# Patient Record
Sex: FEMALE | Race: WHITE | NOT HISPANIC OR LATINO | Employment: FULL TIME | URBAN - METROPOLITAN AREA
[De-identification: names, ages, dates, MRNs, and addresses within clinical notes are randomized per-mention and may not be internally consistent; named-entity substitution may affect disease eponyms.]

---

## 2022-07-20 ENCOUNTER — EMPLOYEE HEALTH (OUTPATIENT)
Dept: OCCUPATIONAL MEDICINE | Facility: CLINIC | Age: 22
End: 2022-07-20

## 2022-07-20 ENCOUNTER — HOSPITAL ENCOUNTER (OUTPATIENT)
Facility: MEDICAL CENTER | Age: 22
End: 2022-07-20
Attending: PREVENTIVE MEDICINE
Payer: COMMERCIAL

## 2022-07-20 ENCOUNTER — EH NON-PROVIDER (OUTPATIENT)
Dept: OCCUPATIONAL MEDICINE | Facility: CLINIC | Age: 22
End: 2022-07-20

## 2022-07-20 DIAGNOSIS — Z02.1 PRE-EMPLOYMENT DRUG SCREENING: ICD-10-CM

## 2022-07-20 DIAGNOSIS — Z02.1 PRE-EMPLOYMENT HEALTH SCREENING EXAMINATION: ICD-10-CM

## 2022-07-20 DIAGNOSIS — Z02.1 PRE-EMPLOYMENT DRUG SCREENING: Primary | ICD-10-CM

## 2022-07-20 LAB
AMP AMPHETAMINE: NORMAL
BAR BARBITURATES: NORMAL
BZO BENZODIAZEPINES: NORMAL
COC COCAINE: NORMAL
INT CON NEG: NORMAL
INT CON POS: NORMAL
MDMA ECSTASY: NORMAL
MET METHAMPHETAMINES: NORMAL
MTD METHADONE: NORMAL
OPI OPIATES: NORMAL
OXY OXYCODONE: NORMAL
PCP PHENCYCLIDINE: NORMAL
POC URINE DRUG SCREEN OCDRS: NORMAL
THC: NORMAL

## 2022-07-20 PROCEDURE — 80305 DRUG TEST PRSMV DIR OPT OBS: CPT | Performed by: PREVENTIVE MEDICINE

## 2022-07-20 PROCEDURE — 8911 PR MRO FEE: Performed by: NURSE PRACTITIONER

## 2022-07-20 PROCEDURE — 8915 PR COMPREHENSIVE PHYSICAL: Performed by: PREVENTIVE MEDICINE

## 2022-07-20 PROCEDURE — 86480 TB TEST CELL IMMUN MEASURE: CPT | Performed by: PREVENTIVE MEDICINE

## 2022-07-20 PROCEDURE — 90715 TDAP VACCINE 7 YRS/> IM: CPT | Performed by: NURSE PRACTITIONER

## 2022-07-22 LAB
GAMMA INTERFERON BACKGROUND BLD IA-ACNC: 0.04 IU/ML
M TB IFN-G BLD-IMP: NEGATIVE
M TB IFN-G CD4+ BCKGRND COR BLD-ACNC: 0 IU/ML
MITOGEN IGNF BCKGRD COR BLD-ACNC: >10 IU/ML
QFT TB2 - NIL TBQ2: 0.01 IU/ML

## 2022-08-08 ENCOUNTER — RESEARCH ENCOUNTER (OUTPATIENT)
Dept: RESEARCH | Facility: WORKSITE | Age: 22
End: 2022-08-08
Payer: COMMERCIAL

## 2022-08-08 DIAGNOSIS — Z00.6 RESEARCH STUDY PATIENT: ICD-10-CM

## 2022-08-26 ENCOUNTER — EH NON-PROVIDER (OUTPATIENT)
Dept: OCCUPATIONAL MEDICINE | Facility: CLINIC | Age: 22
End: 2022-08-26
Payer: COMMERCIAL

## 2022-09-13 ENCOUNTER — HOSPITAL ENCOUNTER (OUTPATIENT)
Dept: RADIOLOGY | Facility: MEDICAL CENTER | Age: 22
End: 2022-09-13
Attending: FAMILY MEDICINE
Payer: COMMERCIAL

## 2022-09-13 DIAGNOSIS — E28.2 PCOS (POLYCYSTIC OVARIAN SYNDROME): ICD-10-CM

## 2022-09-13 PROCEDURE — 76830 TRANSVAGINAL US NON-OB: CPT

## 2022-09-25 LAB
APOB+LDLR+PCSK9 GENE MUT ANL BLD/T: NOT DETECTED
BRCA1+BRCA2 DEL+DUP + FULL MUT ANL BLD/T: NOT DETECTED
MLH1+MSH2+MSH6+PMS2 GN DEL+DUP+FUL M: NOT DETECTED

## 2022-10-30 ENCOUNTER — OFFICE VISIT (OUTPATIENT)
Dept: URGENT CARE | Facility: CLINIC | Age: 22
End: 2022-10-30
Payer: COMMERCIAL

## 2022-10-30 VITALS
RESPIRATION RATE: 18 BRPM | SYSTOLIC BLOOD PRESSURE: 154 MMHG | WEIGHT: 293 LBS | BODY MASS INDEX: 41.95 KG/M2 | OXYGEN SATURATION: 98 % | DIASTOLIC BLOOD PRESSURE: 102 MMHG | TEMPERATURE: 97.9 F | HEART RATE: 94 BPM | HEIGHT: 70 IN

## 2022-10-30 DIAGNOSIS — J02.9 SORE THROAT: ICD-10-CM

## 2022-10-30 DIAGNOSIS — J02.0 STREP PHARYNGITIS: ICD-10-CM

## 2022-10-30 PROCEDURE — 99203 OFFICE O/P NEW LOW 30 MIN: CPT | Performed by: PHYSICIAN ASSISTANT

## 2022-10-30 RX ORDER — AMOXICILLIN AND CLAVULANATE POTASSIUM 875; 125 MG/1; MG/1
1 TABLET, FILM COATED ORAL 2 TIMES DAILY
Qty: 20 TABLET | Refills: 0 | Status: SHIPPED | OUTPATIENT
Start: 2022-10-30 | End: 2022-11-09

## 2022-10-30 ASSESSMENT — ENCOUNTER SYMPTOMS
DIARRHEA: 0
COUGH: 1
SHORTNESS OF BREATH: 0
VOMITING: 0
MYALGIAS: 0
HEADACHES: 0
EYE DISCHARGE: 0
TROUBLE SWALLOWING: 0
EYE REDNESS: 0
NAUSEA: 0
FEVER: 0
SORE THROAT: 1

## 2022-10-30 NOTE — PROGRESS NOTES
"Francoise Schafer is a 22 y.o. female who presents with Sore Throat (Started yesterday )            Pharyngitis   This is a new problem. The current episode started yesterday (last night). The problem has been unchanged. Neither side of throat is experiencing more pain than the other. There has been no fever. The pain is mild. Associated symptoms include coughing (The patient reports a slight intermittent cough.). Pertinent negatives include no congestion, diarrhea, drooling, ear pain, headaches, shortness of breath, trouble swallowing or vomiting. She has had exposure to strep. Exposure to: The patient is remained recently tested positive for strep throat.. She has tried nothing for the symptoms.     PMH:  has no past medical history on file.  MEDS: No current outpatient medications on file.  ALLERGIES: Not on File  SURGHX: No past surgical history on file.  SOCHX:    FH: Family history was reviewed, no pertinent findings to report    Review of Systems   Constitutional:  Negative for fever.   HENT:  Positive for sore throat. Negative for congestion, drooling, ear pain and trouble swallowing.    Eyes:  Negative for discharge and redness.   Respiratory:  Positive for cough (The patient reports a slight intermittent cough.). Negative for shortness of breath.    Cardiovascular:  Negative for chest pain.   Gastrointestinal:  Negative for diarrhea, nausea and vomiting.   Musculoskeletal:  Negative for myalgias.   Neurological:  Negative for headaches.            Objective     BP (!) 154/102 (BP Location: Left arm, Patient Position: Sitting, BP Cuff Size: Large adult long)   Pulse 94   Temp 36.6 °C (97.9 °F) (Temporal)   Resp 18   Ht 1.778 m (5' 10\")   Wt (!) 169 kg (373 lb 9.6 oz)   LMP 10/16/2022 Comment: IUD Mirena  SpO2 98%   BMI 53.61 kg/m²      Physical Exam  Constitutional:       General: She is not in acute distress.     Appearance: Normal appearance. She is not ill-appearing.   HENT:      " Head: Normocephalic and atraumatic.      Right Ear: External ear normal.      Left Ear: External ear normal.      Nose: Nose normal.      Mouth/Throat:      Mouth: Mucous membranes are moist.      Pharynx: Oropharynx is clear. Uvula midline. Posterior oropharyngeal erythema present.      Tonsils: No tonsillar exudate.   Eyes:      Extraocular Movements: Extraocular movements intact.      Conjunctiva/sclera: Conjunctivae normal.   Cardiovascular:      Rate and Rhythm: Normal rate and regular rhythm.      Heart sounds: Normal heart sounds.   Pulmonary:      Effort: Pulmonary effort is normal. No respiratory distress.      Breath sounds: Normal breath sounds. No wheezing.   Musculoskeletal:         General: Normal range of motion.      Cervical back: Normal range of motion and neck supple.   Skin:     General: Skin is warm and dry.   Neurological:      Mental Status: She is alert and oriented to person, place, and time.             Progress:  POCT Rapid Strep: POSITIVE               Assessment & Plan            1. Sore throat    2. Strep pharyngitis  - amoxicillin-clavulanate (AUGMENTIN) 875-125 MG Tab; Take 1 Tablet by mouth 2 times a day for 10 days.  Dispense: 20 Tablet; Refill: 0    Differential diagnoses, supportive care, and indications for immediate follow-up discussed with patient.   Instructed to return to clinic or nearest emergency department for any change in condition, further concerns, or worsening of symptoms.    OTC Tylenol or Motrin for fever/discomfort.  OTC Supportive Care for Sore Throat - warm salt water gargles, sore throat lozenges, warm lemon water, and/or tea.  Drink plenty of fluids  Follow-up with PCP   Return to clinic or go to the ED if symptoms worsen or fail to improve, or if patient should develop worsening/increasing sore throat, difficulty swallowing, drooling, change in voice, swollen glands, shortness of breath, ear pain, cough, congestion, fever/chills, and/or any concerning  symptoms.    Discussed plan with the patient, and she agrees to the above.     I personally reviewed prior external notes and test results pertinent to today's visit.  I have independently reviewed and interpreted all diagnostics ordered during this urgent care visit.     Please note that this dictation was created using voice recognition software. I have made every reasonable attempt to correct obvious errors, but I expect that there may be errors of grammar and possibly content that I did not discover before finalizing the note.     This note was electronically signed by Corinne Thorne PA-C

## 2022-11-09 ENCOUNTER — APPOINTMENT (OUTPATIENT)
Dept: DERMATOLOGY | Facility: IMAGING CENTER | Age: 22
End: 2022-11-09

## 2022-11-15 ENCOUNTER — APPOINTMENT (OUTPATIENT)
Dept: RADIOLOGY | Facility: MEDICAL CENTER | Age: 22
End: 2022-11-15
Attending: STUDENT IN AN ORGANIZED HEALTH CARE EDUCATION/TRAINING PROGRAM
Payer: COMMERCIAL

## 2022-11-15 DIAGNOSIS — M25.469 EFFUSION OF KNEE, UNSPECIFIED LATERALITY: ICD-10-CM

## 2022-11-15 DIAGNOSIS — M25.541 PAIN IN JOINTS OF RIGHT HAND: ICD-10-CM

## 2022-11-15 DIAGNOSIS — M25.561 RIGHT KNEE PAIN, UNSPECIFIED CHRONICITY: ICD-10-CM

## 2022-11-15 DIAGNOSIS — M25.572 PAIN IN LEFT ANKLE AND JOINTS OF LEFT FOOT: ICD-10-CM

## 2022-11-15 DIAGNOSIS — M25.562 LEFT KNEE PAIN, UNSPECIFIED CHRONICITY: ICD-10-CM

## 2022-11-15 DIAGNOSIS — M25.571 PAIN IN RIGHT ANKLE AND JOINTS OF RIGHT FOOT: ICD-10-CM

## 2022-11-15 DIAGNOSIS — R00.2 PALPITATIONS: ICD-10-CM

## 2022-11-15 DIAGNOSIS — M25.542 PAIN IN JOINT OF LEFT HAND: ICD-10-CM

## 2022-11-15 PROCEDURE — 73562 X-RAY EXAM OF KNEE 3: CPT | Mod: LT

## 2022-11-15 PROCEDURE — 73562 X-RAY EXAM OF KNEE 3: CPT | Mod: RT

## 2022-11-15 PROCEDURE — 73565 X-RAY EXAM OF KNEES: CPT

## 2022-12-23 ENCOUNTER — HOSPITAL ENCOUNTER (OUTPATIENT)
Dept: LAB | Facility: MEDICAL CENTER | Age: 22
End: 2022-12-23
Attending: STUDENT IN AN ORGANIZED HEALTH CARE EDUCATION/TRAINING PROGRAM
Payer: COMMERCIAL

## 2022-12-23 LAB
CRP SERPL HS-MCNC: 3.57 MG/DL (ref 0–0.75)
ERYTHROCYTE [SEDIMENTATION RATE] IN BLOOD BY WESTERGREN METHOD: 32 MM/HOUR (ref 0–25)
RHEUMATOID FACT SER IA-ACNC: <10 IU/ML (ref 0–14)

## 2022-12-23 PROCEDURE — 86200 CCP ANTIBODY: CPT

## 2022-12-23 PROCEDURE — 85652 RBC SED RATE AUTOMATED: CPT

## 2022-12-23 PROCEDURE — 36415 COLL VENOUS BLD VENIPUNCTURE: CPT

## 2022-12-23 PROCEDURE — 81381 HLA I TYPING 1 ALLELE HR: CPT

## 2022-12-23 PROCEDURE — 86431 RHEUMATOID FACTOR QUANT: CPT

## 2022-12-23 PROCEDURE — 86140 C-REACTIVE PROTEIN: CPT

## 2022-12-26 LAB — CCP IGG SERPL-ACNC: 4 UNITS (ref 0–19)

## 2023-01-03 LAB
HLA-B*57:01 QL: NEGATIVE
SPECIMEN SOURCE: NORMAL

## 2023-02-28 ENCOUNTER — HOSPITAL ENCOUNTER (OUTPATIENT)
Dept: LAB | Facility: MEDICAL CENTER | Age: 23
End: 2023-02-28
Attending: STUDENT IN AN ORGANIZED HEALTH CARE EDUCATION/TRAINING PROGRAM
Payer: COMMERCIAL

## 2023-02-28 LAB
ALBUMIN SERPL BCP-MCNC: 3.8 G/DL (ref 3.2–4.9)
ALP SERPL-CCNC: 87 U/L (ref 30–99)
ALT SERPL-CCNC: 19 U/L (ref 2–50)
ANION GAP SERPL CALC-SCNC: 12 MMOL/L (ref 7–16)
AST SERPL-CCNC: 17 U/L (ref 12–45)
BASOPHILS # BLD AUTO: 0.4 % (ref 0–1.8)
BASOPHILS # BLD: 0.04 K/UL (ref 0–0.12)
BILIRUB CONJ SERPL-MCNC: <0.2 MG/DL (ref 0.1–0.5)
BILIRUB INDIRECT SERPL-MCNC: NORMAL MG/DL (ref 0–1)
BILIRUB SERPL-MCNC: 0.3 MG/DL (ref 0.1–1.5)
BUN SERPL-MCNC: 17 MG/DL (ref 8–22)
CALCIUM SERPL-MCNC: 9.1 MG/DL (ref 8.4–10.2)
CHLORIDE SERPL-SCNC: 102 MMOL/L (ref 96–112)
CHOLEST SERPL-MCNC: 204 MG/DL (ref 100–199)
CO2 SERPL-SCNC: 21 MMOL/L (ref 20–33)
COMMENT 1642: NORMAL
CORTIS SERPL-MCNC: 7.6 UG/DL (ref 0–23)
CREAT SERPL-MCNC: 0.64 MG/DL (ref 0.5–1.4)
CRP SERPL HS-MCNC: 2.56 MG/DL (ref 0–0.75)
DHEA-S SERPL-MCNC: 455 UG/DL (ref 148–407)
EOSINOPHIL # BLD AUTO: 0.07 K/UL (ref 0–0.51)
EOSINOPHIL NFR BLD: 0.7 % (ref 0–6.9)
ERYTHROCYTE [DISTWIDTH] IN BLOOD BY AUTOMATED COUNT: 41.1 FL (ref 35.9–50)
ERYTHROCYTE [SEDIMENTATION RATE] IN BLOOD BY WESTERGREN METHOD: 37 MM/HOUR (ref 0–25)
EST. AVERAGE GLUCOSE BLD GHB EST-MCNC: 91 MG/DL
ESTRADIOL SERPL-MCNC: 72.7 PG/ML
FASTING STATUS PATIENT QL REPORTED: NORMAL
FSH SERPL-ACNC: 5 MIU/ML
GFR SERPLBLD CREATININE-BSD FMLA CKD-EPI: 129 ML/MIN/1.73 M 2
GLUCOSE SERPL-MCNC: 80 MG/DL (ref 65–99)
HBA1C MFR BLD: 4.8 % (ref 4–5.6)
HBV CORE AB SERPL QL IA: NONREACTIVE
HBV SURFACE AB SERPL IA-ACNC: 43 MIU/ML (ref 0–10)
HBV SURFACE AG SER QL: NORMAL
HCT VFR BLD AUTO: 43.6 % (ref 37–47)
HCV AB SER QL: NORMAL
HDLC SERPL-MCNC: 43 MG/DL
HGB BLD-MCNC: 14.6 G/DL (ref 12–16)
IMM GRANULOCYTES # BLD AUTO: 0.04 K/UL (ref 0–0.11)
IMM GRANULOCYTES NFR BLD AUTO: 0.4 % (ref 0–0.9)
LDLC SERPL CALC-MCNC: 146 MG/DL
LH SERPL-ACNC: 9.7 IU/L
LYMPHOCYTES # BLD AUTO: 2.68 K/UL (ref 1–4.8)
LYMPHOCYTES NFR BLD: 27 % (ref 22–41)
MCH RBC QN AUTO: 29.1 PG (ref 27–33)
MCHC RBC AUTO-ENTMCNC: 33.5 G/DL (ref 33.6–35)
MCV RBC AUTO: 86.9 FL (ref 81.4–97.8)
MONOCYTES # BLD AUTO: 0.43 K/UL (ref 0–0.85)
MONOCYTES NFR BLD AUTO: 4.3 % (ref 0–13.4)
NEUTROPHILS # BLD AUTO: 6.68 K/UL (ref 2–7.15)
NEUTROPHILS NFR BLD: 67.2 % (ref 44–72)
NRBC # BLD AUTO: 0 K/UL
NRBC BLD-RTO: 0 /100 WBC
PLATELET # BLD AUTO: 294 K/UL (ref 164–446)
PLATELET BLD QL SMEAR: NORMAL
PMV BLD AUTO: 10.2 FL (ref 9–12.9)
POTASSIUM SERPL-SCNC: 3.8 MMOL/L (ref 3.6–5.5)
PROT SERPL-MCNC: 7.7 G/DL (ref 6–8.2)
RBC # BLD AUTO: 5.02 M/UL (ref 4.2–5.4)
RBC BLD AUTO: NORMAL
SODIUM SERPL-SCNC: 135 MMOL/L (ref 135–145)
T3FREE SERPL-MCNC: 3.37 PG/ML (ref 2–4.4)
T4 FREE SERPL-MCNC: 1.12 NG/DL (ref 0.93–1.7)
TESTOST SERPL-MCNC: 72 NG/DL (ref 9–75)
THYROPEROXIDASE AB SERPL-ACNC: 19 IU/ML (ref 0–9)
TRIGL SERPL-MCNC: 75 MG/DL (ref 0–149)
TSH SERPL DL<=0.005 MIU/L-ACNC: 2.28 UIU/ML (ref 0.38–5.33)
VIT B12 SERPL-MCNC: 454 PG/ML (ref 211–911)
WBC # BLD AUTO: 9.9 K/UL (ref 4.8–10.8)

## 2023-02-28 PROCEDURE — 81381 HLA I TYPING 1 ALLELE HR: CPT

## 2023-02-28 PROCEDURE — 87340 HEPATITIS B SURFACE AG IA: CPT

## 2023-02-28 PROCEDURE — 82157 ASSAY OF ANDROSTENEDIONE: CPT

## 2023-02-28 PROCEDURE — 86200 CCP ANTIBODY: CPT

## 2023-02-28 PROCEDURE — 83002 ASSAY OF GONADOTROPIN (LH): CPT

## 2023-02-28 PROCEDURE — 82533 TOTAL CORTISOL: CPT

## 2023-02-28 PROCEDURE — 82607 VITAMIN B-12: CPT

## 2023-02-28 PROCEDURE — 86431 RHEUMATOID FACTOR QUANT: CPT

## 2023-02-28 PROCEDURE — 86803 HEPATITIS C AB TEST: CPT

## 2023-02-28 PROCEDURE — 83036 HEMOGLOBIN GLYCOSYLATED A1C: CPT

## 2023-02-28 PROCEDURE — 36415 COLL VENOUS BLD VENIPUNCTURE: CPT

## 2023-02-28 PROCEDURE — 84443 ASSAY THYROID STIM HORMONE: CPT

## 2023-02-28 PROCEDURE — 80048 BASIC METABOLIC PNL TOTAL CA: CPT

## 2023-02-28 PROCEDURE — 86376 MICROSOMAL ANTIBODY EACH: CPT

## 2023-02-28 PROCEDURE — 84403 ASSAY OF TOTAL TESTOSTERONE: CPT

## 2023-02-28 PROCEDURE — 86706 HEP B SURFACE ANTIBODY: CPT

## 2023-02-28 PROCEDURE — 83001 ASSAY OF GONADOTROPIN (FSH): CPT

## 2023-02-28 PROCEDURE — 82670 ASSAY OF TOTAL ESTRADIOL: CPT

## 2023-02-28 PROCEDURE — 84439 ASSAY OF FREE THYROXINE: CPT

## 2023-02-28 PROCEDURE — 80076 HEPATIC FUNCTION PANEL: CPT

## 2023-02-28 PROCEDURE — 84270 ASSAY OF SEX HORMONE GLOBUL: CPT

## 2023-02-28 PROCEDURE — 84481 FREE ASSAY (FT-3): CPT

## 2023-02-28 PROCEDURE — 82024 ASSAY OF ACTH: CPT

## 2023-02-28 PROCEDURE — 80061 LIPID PANEL: CPT

## 2023-02-28 PROCEDURE — 86140 C-REACTIVE PROTEIN: CPT

## 2023-02-28 PROCEDURE — 85025 COMPLETE CBC W/AUTO DIFF WBC: CPT

## 2023-02-28 PROCEDURE — 85652 RBC SED RATE AUTOMATED: CPT

## 2023-02-28 PROCEDURE — 86704 HEP B CORE ANTIBODY TOTAL: CPT

## 2023-02-28 PROCEDURE — 82626 DEHYDROEPIANDROSTERONE: CPT

## 2023-02-28 PROCEDURE — 82627 DEHYDROEPIANDROSTERONE: CPT

## 2023-02-28 PROCEDURE — 82530 CORTISOL FREE: CPT

## 2023-03-01 LAB
ACTH PLAS-MCNC: 23.3 PG/ML (ref 7.2–63.3)
RHEUMATOID FACT SER IA-ACNC: <10 IU/ML (ref 0–14)
SHBG SERPL-SCNC: 32 NMOL/L (ref 25–122)

## 2023-03-02 LAB — CCP IGG SERPL-ACNC: 3 UNITS (ref 0–19)

## 2023-03-04 LAB
ANDROST SERPL-MCNC: 1.29 NG/ML (ref 0.26–2.14)
DHEA SERPL-MCNC: 6.91 NG/ML (ref 1.33–7.78)

## 2023-03-05 LAB
ANNOTATION COMMENT IMP: NORMAL
COLLECT DURATION TIME SPEC: NORMAL HRS
CORTIS F 24H UR HPLC-MCNC: 13.1 UG/L
CORTIS F 24H UR-MRATE: NORMAL UG/D
CORTIS F/CREAT 24H UR: 4.53 UG/G CRT
CREAT 24H UR-MCNC: 289 MG/DL
CREAT 24H UR-MRATE: NORMAL MG/D (ref 700–1600)
SPECIMEN VOL ?TM UR: NORMAL ML

## 2023-03-06 LAB
HLA-B*57:01 QL: NEGATIVE
SPECIMEN SOURCE: NORMAL

## 2023-04-27 ENCOUNTER — HOSPITAL ENCOUNTER (OUTPATIENT)
Dept: LAB | Facility: MEDICAL CENTER | Age: 23
End: 2023-04-27
Attending: INTERNAL MEDICINE
Payer: COMMERCIAL

## 2023-04-27 LAB
25(OH)D3 SERPL-MCNC: 30 NG/ML (ref 30–100)
ALBUMIN SERPL BCP-MCNC: 4.1 G/DL (ref 3.2–4.9)
ALBUMIN/GLOB SERPL: 1.1 G/DL
ALP SERPL-CCNC: 82 U/L (ref 30–99)
ALT SERPL-CCNC: 21 U/L (ref 2–50)
ANION GAP SERPL CALC-SCNC: 10 MMOL/L (ref 7–16)
AST SERPL-CCNC: 17 U/L (ref 12–45)
BILIRUB SERPL-MCNC: 0.4 MG/DL (ref 0.1–1.5)
BUN SERPL-MCNC: 13 MG/DL (ref 8–22)
CALCIUM ALBUM COR SERPL-MCNC: 9.4 MG/DL (ref 8.5–10.5)
CALCIUM SERPL-MCNC: 9.5 MG/DL (ref 8.4–10.2)
CHLORIDE SERPL-SCNC: 104 MMOL/L (ref 96–112)
CO2 SERPL-SCNC: 24 MMOL/L (ref 20–33)
CREAT SERPL-MCNC: 0.7 MG/DL (ref 0.5–1.4)
EST. AVERAGE GLUCOSE BLD GHB EST-MCNC: 94 MG/DL
GFR SERPLBLD CREATININE-BSD FMLA CKD-EPI: 126 ML/MIN/1.73 M 2
GLOBULIN SER CALC-MCNC: 3.6 G/DL (ref 1.9–3.5)
GLUCOSE SERPL-MCNC: 86 MG/DL (ref 65–99)
HBA1C MFR BLD: 4.9 % (ref 4–5.6)
POTASSIUM SERPL-SCNC: 4 MMOL/L (ref 3.6–5.5)
PROT SERPL-MCNC: 7.7 G/DL (ref 6–8.2)
SODIUM SERPL-SCNC: 138 MMOL/L (ref 135–145)
T3FREE SERPL-MCNC: 2.94 PG/ML (ref 2–4.4)
T4 FREE SERPL-MCNC: 1.13 NG/DL (ref 0.93–1.7)
TSH SERPL DL<=0.005 MIU/L-ACNC: 1 UIU/ML (ref 0.38–5.33)
VIT B12 SERPL-MCNC: 439 PG/ML (ref 211–911)

## 2023-04-27 PROCEDURE — 84439 ASSAY OF FREE THYROXINE: CPT

## 2023-04-27 PROCEDURE — 82306 VITAMIN D 25 HYDROXY: CPT

## 2023-04-27 PROCEDURE — 82607 VITAMIN B-12: CPT

## 2023-04-27 PROCEDURE — 36415 COLL VENOUS BLD VENIPUNCTURE: CPT

## 2023-04-27 PROCEDURE — 84481 FREE ASSAY (FT-3): CPT

## 2023-04-27 PROCEDURE — 80053 COMPREHEN METABOLIC PANEL: CPT

## 2023-04-27 PROCEDURE — 83036 HEMOGLOBIN GLYCOSYLATED A1C: CPT

## 2023-04-27 PROCEDURE — 84443 ASSAY THYROID STIM HORMONE: CPT

## 2023-04-27 PROCEDURE — 83525 ASSAY OF INSULIN: CPT

## 2023-04-29 LAB — INSULIN P FAST SERPL-ACNC: 24 UIU/ML (ref 3–25)

## 2023-05-18 ENCOUNTER — OFFICE VISIT (OUTPATIENT)
Dept: RHEUMATOLOGY | Facility: MEDICAL CENTER | Age: 23
End: 2023-05-18
Attending: INTERNAL MEDICINE
Payer: COMMERCIAL

## 2023-05-18 DIAGNOSIS — E28.2 PCOS (POLYCYSTIC OVARIAN SYNDROME): ICD-10-CM

## 2023-05-18 DIAGNOSIS — Z51.81 MEDICATION MONITORING ENCOUNTER: ICD-10-CM

## 2023-05-18 DIAGNOSIS — L40.50 PSORIATIC ARTHRITIS (HCC): ICD-10-CM

## 2023-05-18 DIAGNOSIS — E66.3 OVERWEIGHT: ICD-10-CM

## 2023-05-18 PROCEDURE — 99205 OFFICE O/P NEW HI 60 MIN: CPT | Performed by: INTERNAL MEDICINE

## 2023-05-18 RX ORDER — SPIRONOLACTONE 50 MG/1
50 TABLET, FILM COATED ORAL DAILY
COMMUNITY
Start: 2023-04-07

## 2023-05-18 RX ORDER — SEMAGLUTIDE 0.5 MG/.5ML
INJECTION, SOLUTION SUBCUTANEOUS
COMMUNITY
Start: 2023-05-02

## 2023-05-18 ASSESSMENT — PATIENT HEALTH QUESTIONNAIRE - PHQ9: CLINICAL INTERPRETATION OF PHQ2 SCORE: 0

## 2023-05-18 ASSESSMENT — JOINT PAIN
TOTAL NUMBER OF SWOLLEN JOINTS: 0
TOTAL NUMBER OF TENDER JOINTS: 20
TOTAL NUMBER OF TENDER JOINTS: 23

## 2023-05-18 ASSESSMENT — FIBROSIS 4 INDEX: FIB4 SCORE: 0.25

## 2023-05-18 NOTE — PROGRESS NOTES
Chief Complaint-joint pain    Chief Complaint   Patient presents with    Follow-Up     Jessica Schafer is a 22 y.o. female here as a new Rheumatology Consult referred by Pcp Not In Computer    This is a new patient evaluation    HPI:   This is a new patient evaluation, patient referred for evaluation of possible psoriatic arthritis, patient states that she has had psoriasis since about 2018 and a diagnosis of psoriasis has been confirmed by dermatology.  Patient states she has also had joint issues for 5 years since 2018 primarily in the knees that is worse with activity.  Patient also states she gets swelling and warmth around the outside of her ankles near her Os-Murphy and Achilles, patient also states she gets intermittent low back problems as well.  Patient states she does recall getting some pitting in her nails occasionally, and states that her mother is on Enbrel for some sort of inflammatory arthritis.  Patient is currently on Cimzia starting May 2023 managed by dermatology.    Comorbidities include PCOS for which she is seeing endocrinology, asthma, anxiety and depression.      PMHx  Anxiety  Depression  PCOS  Bilateral arch reconstruction  Asthma    Fam Hx  M-hypothyroid, inflammatory arthritis on Enbrel  F-overweight, asthma, depression, anxiety  Bx1 A/W possible knee problems  Sx1 A/W    Soc Hx  No tobacco  No ETOH  No blood tx  No tattoos      TPO 19.0 2/2023  HBsAg/HBcAb neg 2/2023  HCV neg 2/2023  Quantiferon Gold neg 7/2022  CCP neg 12/2022; CCP neg 2/2023  RF neg 12/2022; RF neg 2/2023    Knee X-rays 11/2022-IMPRESSION:  1. No acute osseous abnormality.  2. Unremarkable weightbearing view of the bilateral knees with no malalignment.    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      Certolizumab Pegol (CIMZIA PREFILLED SC) Inject  under the skin. 2 injections every 14 days       No current facility-administered medications for this visit.     She  has no past medical history on file.  She  has no past surgical history on file.  History reviewed. No pertinent family history.  No family status information on file.        Social History     Social History Narrative    Not on file       ROS   Other than what is mentioned in HPI or physical exam, there is no history of headaches, double vision or blurred vision. No temporal tenderness or jaw claudication.  No trouble swallowing difficulties or sore throats.  No chest complaints including chest pain, cough or sputum production. No GI complaints including nausea, vomiting, change in bowel habits, or past peptic ulcer disease. No history of blood in the stools. No urinary complaints including dysuria or frequency. No history of alopecia, photosensitivity, oral ulcerations, Raynaud's phenomena.       Objective:     There were no vitals taken for this visit. Body mass index is 53.66 kg/m² (pended).  Physical Exam:    Constitutional: Alert and oriented X3, no distress.Skin: Warm, dry, good turgor, positive psoriatic rashes on lateral thighs as well as abdomen, also some mild facial hair compatible with PCOS.  Eye: Equal, round and reactive, conjunctiva clear, lids normal EOM intactENMT: Lips without lesions, good dentition, no oropharyngeal ulcers, moist buccal mucosa, pinna without deformityNeck: Trachea midline, no masses, no thyromegaly.Lymph:  No cervical lymphadenopathy, no axillary lymphadenopathy,  no supraclavicular lymphadenopathyRespiratory: Unlabored respiratory effort, lungs clear to auscultation, no wheezes, no ronchi.Cardiovascular: Normal S1, S2, Regular rate and rhythm, no murmurs rubs or gallops  Abdomen: Soft, non-tender, no masses, no hepatosplenomegaly, frequently overweight.Psych: Alert and oriented x3, normal affect and mood.Neuro Cranial nerves 2-12 are grossly intact, no loss of sensation LEExt:no joint laxity noted in bilateral arms, no joint laxity noted in bilateral legs, no marissa dactylitis, no sausage digits, shoulders full range of motion without limitations, gait without antalgia and without foot drop, no flexion contractures appreciated no deformities of fingers or toes, patient does point to the patellar tendon when she does get pain in that right knee.      Lab Results   Component Value Date/Time    HEPBCORTOT NonReactive 02/28/2023 12:19 PM    HEPBSAG Non-Reactive 02/28/2023 12:19 PM     Lab Results   Component Value Date/Time    HEPCAB Non-Reactive 02/28/2023 12:19 PM     Lab Results   Component Value Date/Time    SODIUM 138 04/27/2023 11:46 AM    POTASSIUM 4.0 04/27/2023 11:46 AM    CHLORIDE 104 04/27/2023 11:46 AM    CO2 24 04/27/2023 11:46 AM    GLUCOSE 86 04/27/2023 11:46 AM    BUN 13 04/27/2023 11:46 AM    CREATININE 0.70 04/27/2023 11:46 AM      Lab Results   Component Value Date/Time    WBC 9.9 02/28/2023 12:19 PM    RBC 5.02 02/28/2023 12:19 PM    HEMOGLOBIN 14.6 02/28/2023 12:19 PM    HEMATOCRIT 43.6 02/28/2023 12:19 PM    MCV 86.9 02/28/2023 12:19 PM    MCH 29.1 02/28/2023 12:19 PM    MCHC 33.5 (L) 02/28/2023 12:19 PM    MPV 10.2 02/28/2023 12:19 PM    NEUTSPOLYS 67.20 02/28/2023 12:19 PM    LYMPHOCYTES 27.00 02/28/2023 12:19 PM    MONOCYTES 4.30 02/28/2023 12:19 PM    EOSINOPHILS 0.70 02/28/2023 12:19 PM    BASOPHILS 0.40 02/28/2023 12:19 PM      Lab Results   Component Value Date/Time    CALCIUM 9.5 04/27/2023 11:46 AM    ASTSGOT 17 04/27/2023 11:46 AM    ALTSGPT 21  04/27/2023 11:46 AM    ALKPHOSPHAT 82 04/27/2023 11:46 AM    TBILIRUBIN 0.4 04/27/2023 11:46 AM    ALBUMIN 4.1 04/27/2023 11:46 AM    TOTPROTEIN 7.7 04/27/2023 11:46 AM     Lab Results   Component Value Date/Time    RHEUMFACTN <10 02/28/2023 12:13 PM    CCPANTIBODY 3 02/28/2023 12:13 PM     Lab Results   Component Value Date/Time    SEDRATEWES 37 (H) 02/28/2023 12:13 PM    CREACTPROT 2.56 (H) 02/28/2023 12:13 PM     Lab Results   Component Value Date/Time    TOTALVOLUME Random 02/28/2023 12:26 PM     Lab Results   Component Value Date/Time    HBA1C 4.9 04/27/2023 11:46 AM     Lab Results   Component Value Date/Time    25HYDROXY 30 04/27/2023 11:46 AM     Lab Results   Component Value Date/Time    TSHULTRASEN 1.000 04/27/2023 11:46 AM    FREET4 1.13 04/27/2023 11:46 AM     Lab Results   Component Value Date/Time    MICROSOMALA 19.0 (H) 02/28/2023 12:26 PM     Results for orders placed in visit on 11/15/22    DX-KNEES-AP BILATERAL STANDING    Impression  1. No acute osseous abnormality.    2. Unremarkable weightbearing view of the bilateral knees with no malalignment.    Assessment and Plan:  1. Psoriatic arthritis (HCC)  Query possible psoriatic arthritis, we will do MRI right knee as this seems to be the worst joint as far as patient symptoms to evaluate for possible enthesitis of the patellar tendon versus erosive arthritis not seen on x-rays.  We will also do bilateral ankle x-rays and focus on Achilles tendon again for enthesitis and also the actual ankle more T's for evaluation of erosive arthritis.  Additionally we will do bilateral sacroiliac joint x-rays for evaluation of sacroiliitis compatible with psoriatic arthritis  - MR-KNEE-W/O RIGHT; Future  - DX-ANKLE 3+ VIEWS LEFT; Future  - DX-ANKLE 3+ VIEWS RIGHT; Future  - DX-SACROILIAC JOINTS 3+; Future    2. Medication monitoring encounter  Patient is currently on Cimzia prescribed by dermatologist and managed by dermatology.  - MR-KNEE-W/O RIGHT; Future  -  DX-ANKLE 3+ VIEWS LEFT; Future  - DX-ANKLE 3+ VIEWS RIGHT; Future  - DX-SACROILIAC JOINTS 3+; Future    3. PCOS (polycystic ovarian syndrome)  Currently followed by endocrinology    4. Overweight  Currently on Wegovy for weight to control managed by other physician    Records requested.  Followup: Return in about 3 weeks (around 6/8/2023). or sooner prn  Patient was seen 60 minutes face-to-face (excluding time for procedures)  of which more than 50% the time was spent counseling the patient regarding  rheumatological conditions and care. Therapy was discussed in detail.  Thank you for this referral.    Please note that this dictation was created using voice recognition software. I have made every reasonable attempt to correct obvious errors, but I expect that there are errors of grammar and possibly content that I did not discover before finalizing the note.

## 2023-05-18 NOTE — ASSESSMENT & PLAN NOTE
Patient here for eval of PSA, patient has had PSO since the age of [age] confirmed by dermatology, patient staes that having joint problems for 5 years primarily in the knees worse with activity, allyn knees, no swelling, positive low back     PMHx  Anxiety  Depression  PCOS  Bilateral arch reconstruction  Asthma    Fam Hx  M-hypothyroid, inflammatory arthritis on Enbrel  F-overweight, asthma, depression, anxiety  Bx1 A/W possible knee problems  Sx1 A/W    Soc Hx  No tobacco  No ETOH  No blood tx  No tattoos      TPO 19.0 2/2023  HBsAg/HBcAb neg 2/2023  HCV neg 2/2023  Quantiferon Gold neg 7/2022  CCP neg 12/2022; CCP neg 2/2023  RF neg 12/2022; RF neg 2/2023    Knee X-rays 11/2022-IMPRESSION:  1. No acute osseous abnormality.  2. Unremarkable weightbearing view of the bilateral knees with no malalignment.

## 2023-05-19 ENCOUNTER — HOSPITAL ENCOUNTER (OUTPATIENT)
Dept: RADIOLOGY | Facility: MEDICAL CENTER | Age: 23
End: 2023-05-19
Attending: INTERNAL MEDICINE
Payer: COMMERCIAL

## 2023-05-19 DIAGNOSIS — L40.50 PSORIATIC ARTHRITIS (HCC): ICD-10-CM

## 2023-05-19 DIAGNOSIS — Z51.81 MEDICATION MONITORING ENCOUNTER: ICD-10-CM

## 2023-05-19 PROCEDURE — 73610 X-RAY EXAM OF ANKLE: CPT | Mod: RT

## 2023-05-19 PROCEDURE — 73610 X-RAY EXAM OF ANKLE: CPT | Mod: LT

## 2023-05-19 PROCEDURE — 72202 X-RAY EXAM SI JOINTS 3/> VWS: CPT

## 2023-05-21 ENCOUNTER — OFFICE VISIT (OUTPATIENT)
Dept: URGENT CARE | Facility: PHYSICIAN GROUP | Age: 23
End: 2023-05-21
Payer: COMMERCIAL

## 2023-05-21 VITALS
BODY MASS INDEX: 41.95 KG/M2 | HEART RATE: 84 BPM | WEIGHT: 293 LBS | DIASTOLIC BLOOD PRESSURE: 70 MMHG | TEMPERATURE: 97.8 F | OXYGEN SATURATION: 98 % | HEIGHT: 70 IN | SYSTOLIC BLOOD PRESSURE: 128 MMHG

## 2023-05-21 DIAGNOSIS — J01.40 ACUTE PANSINUSITIS, RECURRENCE NOT SPECIFIED: ICD-10-CM

## 2023-05-21 DIAGNOSIS — H66.003 ACUTE SUPPURATIVE OTITIS MEDIA OF BOTH EARS WITHOUT SPONTANEOUS RUPTURE OF TYMPANIC MEMBRANES, RECURRENCE NOT SPECIFIED: ICD-10-CM

## 2023-05-21 DIAGNOSIS — R51.9 SINUS HEADACHE: ICD-10-CM

## 2023-05-21 PROCEDURE — 99213 OFFICE O/P EST LOW 20 MIN: CPT | Performed by: PHYSICIAN ASSISTANT

## 2023-05-21 PROCEDURE — 3078F DIAST BP <80 MM HG: CPT | Performed by: PHYSICIAN ASSISTANT

## 2023-05-21 PROCEDURE — 3074F SYST BP LT 130 MM HG: CPT | Performed by: PHYSICIAN ASSISTANT

## 2023-05-21 RX ORDER — AMOXICILLIN AND CLAVULANATE POTASSIUM 875; 125 MG/1; MG/1
1 TABLET, FILM COATED ORAL 2 TIMES DAILY
Qty: 20 TABLET | Refills: 0 | Status: SHIPPED | OUTPATIENT
Start: 2023-05-21 | End: 2023-11-30

## 2023-05-21 RX ORDER — METHYLPREDNISOLONE 4 MG/1
TABLET ORAL
Qty: 21 TABLET | Refills: 0 | Status: SHIPPED | OUTPATIENT
Start: 2023-05-21 | End: 2023-11-30

## 2023-05-21 ASSESSMENT — ENCOUNTER SYMPTOMS
COUGH: 1
SPUTUM PRODUCTION: 1
WHEEZING: 0
SHORTNESS OF BREATH: 0
SORE THROAT: 0
FEVER: 0
SINUS PRESSURE: 1
HEADACHES: 1
CHILLS: 0
SINUS PAIN: 1

## 2023-05-21 ASSESSMENT — FIBROSIS 4 INDEX: FIB4 SCORE: 0.25

## 2023-05-21 NOTE — LETTER
May 21, 2023         Patient: Jessica Schafer   YOB: 2000   Date of Visit: 5/21/2023           To Whom it May Concern:    Jessica Schafer was seen in my clinic on 5/21/2023. She may return to work on 5/23/2023.    If you have any questions or concerns, please don't hesitate to call.        Sincerely,           Nivia Capellan P.A.-C.  Electronically Signed

## 2023-05-21 NOTE — PROGRESS NOTES
"Subjective     Jessica Schafer is a 22 y.o. female who presents with Sinus Problem            Patient presents with:  Sinus pain, pressure, congestion and sinus headache for over a week, getting worse not better.  PT also co worsening ear pain and pressure for the last 3 days.  Patient mentions no relief of her symptoms.  Patient is concerned about an ear infection and may be a sinus infection.  PT denies any other complaints.        Sinus Problem  This is a new problem. The current episode started 1 to 4 weeks ago. The problem has been gradually worsening since onset. There has been no fever. Her pain is at a severity of 8/10. The pain is moderate. Associated symptoms include congestion, coughing, ear pain, headaches and sinus pressure. Pertinent negatives include no chills, shortness of breath or sore throat. Past treatments include acetaminophen, saline nose sprays and oral decongestants. The treatment provided no relief.       Review of Systems   Constitutional:  Negative for chills and fever.   HENT:  Positive for congestion, ear pain, sinus pressure and sinus pain. Negative for ear discharge and sore throat.    Respiratory:  Positive for cough and sputum production. Negative for shortness of breath and wheezing.    Neurological:  Positive for headaches.   All other systems reviewed and are negative.             Objective     /70 (BP Location: Left arm, Patient Position: Sitting, BP Cuff Size: Adult long)   Pulse 84   Temp 36.6 °C (97.8 °F) (Temporal)   Ht 1.778 m (5' 10\")   Wt (!) 170 kg (374 lb)   SpO2 98%   BMI 53.66 kg/m²      Physical Exam  Vitals and nursing note reviewed.   Constitutional:       General: She is not in acute distress.     Appearance: Normal appearance. She is well-developed and normal weight.   HENT:      Head: Normocephalic and atraumatic.      Right Ear: A middle ear effusion is present. Tympanic membrane is erythematous and retracted.      Left Ear: A middle ear effusion " is present. Tympanic membrane is erythematous and retracted.      Nose: Nasal tenderness and mucosal edema present.      Right Turbinates: Enlarged.      Left Turbinates: Enlarged.      Right Sinus: Maxillary sinus tenderness and frontal sinus tenderness present.      Left Sinus: Maxillary sinus tenderness and frontal sinus tenderness present.      Mouth/Throat:      Lips: Pink.      Mouth: Mucous membranes are moist.      Pharynx: Uvula midline. Posterior oropharyngeal erythema present. No oropharyngeal exudate.   Eyes:      Extraocular Movements: Extraocular movements intact.      Conjunctiva/sclera: Conjunctivae normal.      Pupils: Pupils are equal, round, and reactive to light.   Cardiovascular:      Rate and Rhythm: Normal rate and regular rhythm.      Pulses: Normal pulses.      Heart sounds: Normal heart sounds.   Pulmonary:      Effort: Pulmonary effort is normal. No respiratory distress.      Breath sounds: Normal breath sounds.   Abdominal:      Palpations: Abdomen is soft.   Musculoskeletal:         General: Normal range of motion.      Cervical back: Normal range of motion and neck supple.   Lymphadenopathy:      Cervical: No cervical adenopathy.   Skin:     General: Skin is warm and dry.      Capillary Refill: Capillary refill takes less than 2 seconds.   Neurological:      General: No focal deficit present.      Mental Status: She is alert and oriented to person, place, and time.      Gait: Gait normal.   Psychiatric:         Mood and Affect: Mood normal.                             Assessment & Plan        1. Acute pansinusitis, recurrence not specified    - amoxicillin-clavulanate (AUGMENTIN) 875-125 MG Tab; Take 1 Tablet by mouth 2 times a day.  Dispense: 20 Tablet; Refill: 0  - methylPREDNISolone (MEDROL DOSEPAK) 4 MG Tablet Therapy Pack; Follow schedule on package instructions.  Dispense: 21 Tablet; Refill: 0    2. Acute suppurative otitis media of both ears without spontaneous rupture of  tympanic membranes, recurrence not specified    - amoxicillin-clavulanate (AUGMENTIN) 875-125 MG Tab; Take 1 Tablet by mouth 2 times a day.  Dispense: 20 Tablet; Refill: 0  - methylPREDNISolone (MEDROL DOSEPAK) 4 MG Tablet Therapy Pack; Follow schedule on package instructions.  Dispense: 21 Tablet; Refill: 0    3. Sinus headache    - amoxicillin-clavulanate (AUGMENTIN) 875-125 MG Tab; Take 1 Tablet by mouth 2 times a day.  Dispense: 20 Tablet; Refill: 0  - methylPREDNISolone (MEDROL DOSEPAK) 4 MG Tablet Therapy Pack; Follow schedule on package instructions.  Dispense: 21 Tablet; Refill: 0          Patient HPI and physical exam are consistent with acute pansinusitis as well as bilateral otitis media.    Treat with Augmentin x10 days as well as a Medrol Dosepak.    Cough cold medications as desired for symptom relief.    PT should follow up with PCP in 1-2 days for re-evaluation if symptoms have not improved.      Discussed red flags and reasons to return to UC or ED.      Pt and/or family verbalized understanding of diagnosis and follow up instructions and was offered informational handout on diagnosis.  PT discharged.     Please note that this dictation was created using voice recognition software. I have made every reasonable attempt to correct obvious errors, but I expect that there may be errors of grammar and possibly content that I did not discover before finalizing the note.

## 2023-06-12 ENCOUNTER — OFFICE VISIT (OUTPATIENT)
Dept: URGENT CARE | Facility: PHYSICIAN GROUP | Age: 23
End: 2023-06-12
Payer: COMMERCIAL

## 2023-06-12 ENCOUNTER — APPOINTMENT (OUTPATIENT)
Dept: RHEUMATOLOGY | Facility: MEDICAL CENTER | Age: 23
End: 2023-06-12
Payer: COMMERCIAL

## 2023-06-12 VITALS
HEIGHT: 70 IN | WEIGHT: 293 LBS | SYSTOLIC BLOOD PRESSURE: 122 MMHG | TEMPERATURE: 97.6 F | HEART RATE: 94 BPM | DIASTOLIC BLOOD PRESSURE: 70 MMHG | RESPIRATION RATE: 18 BRPM | BODY MASS INDEX: 41.95 KG/M2 | OXYGEN SATURATION: 96 %

## 2023-06-12 DIAGNOSIS — J32.9 RECURRENT SINUSITIS: ICD-10-CM

## 2023-06-12 PROCEDURE — 3078F DIAST BP <80 MM HG: CPT | Performed by: PHYSICIAN ASSISTANT

## 2023-06-12 PROCEDURE — 99213 OFFICE O/P EST LOW 20 MIN: CPT | Performed by: PHYSICIAN ASSISTANT

## 2023-06-12 PROCEDURE — 3074F SYST BP LT 130 MM HG: CPT | Performed by: PHYSICIAN ASSISTANT

## 2023-06-12 RX ORDER — PSEUDOEPHEDRINE HCL 120 MG/1
120 TABLET, FILM COATED, EXTENDED RELEASE ORAL 2 TIMES DAILY PRN
Qty: 14 TABLET | Refills: 0 | Status: SHIPPED | OUTPATIENT
Start: 2023-06-12 | End: 2023-11-30

## 2023-06-12 RX ORDER — DOXYCYCLINE HYCLATE 100 MG
100 TABLET ORAL 2 TIMES DAILY
Qty: 14 TABLET | Refills: 0 | Status: SHIPPED | OUTPATIENT
Start: 2023-06-15 | End: 2023-06-22

## 2023-06-12 ASSESSMENT — FIBROSIS 4 INDEX: FIB4 SCORE: 0.25

## 2023-06-12 NOTE — PROGRESS NOTES
"Subjective:   Jessica Schafer is a 22 y.o. female who presents for URI (Sinus infection and bilateral ear infection and states she was given antibiotics 2 weeks ago and still not feeling better)      HPI  The patient presents to the Urgent Care with continued sinus symptoms onset over 3 weeks ago.  Now she was seen 3 weeks ago in the urgent care and placed on Augmentin and Medrol Dosepak due to sinus infection and ear infection bilaterally.  Symptoms improved but her symptoms have not resolved completely.  She is still having intermittent ear pressure, pain, ear fullness.  She felt left throat gland swelling and tenderness which has improved.  Sinus headaches intermittently.  Mild intermittent cough. She has not tried anything OTC for her symptoms. No fevers or chills, shortness of breath, or chest pain.      No past medical history on file.  No Known Allergies     Objective:     /70 (BP Location: Left arm, Patient Position: Sitting, BP Cuff Size: Large adult long)   Pulse 94   Temp 36.4 °C (97.6 °F) (Temporal)   Resp 18   Ht 1.778 m (5' 10\")   Wt (!) 159 kg (350 lb)   SpO2 96%   BMI 50.22 kg/m²     Physical Exam  Vitals reviewed.   Constitutional:       General: She is not in acute distress.     Appearance: Normal appearance. She is not ill-appearing or toxic-appearing.   HENT:      Right Ear: Tympanic membrane, ear canal and external ear normal.      Left Ear: Tympanic membrane, ear canal and external ear normal.      Nose: Mucosal edema and rhinorrhea present. Rhinorrhea is clear.      Right Sinus: No maxillary sinus tenderness or frontal sinus tenderness.      Left Sinus: No maxillary sinus tenderness or frontal sinus tenderness.      Mouth/Throat:      Mouth: Mucous membranes are moist.      Pharynx: Oropharynx is clear. No oropharyngeal exudate or posterior oropharyngeal erythema.   Eyes:      Conjunctiva/sclera: Conjunctivae normal.      Pupils: Pupils are equal, round, and reactive to light. "   Cardiovascular:      Rate and Rhythm: Normal rate and regular rhythm.      Heart sounds: Normal heart sounds.   Pulmonary:      Effort: Pulmonary effort is normal. No respiratory distress.      Breath sounds: Normal breath sounds. No wheezing, rhonchi or rales.   Musculoskeletal:      Cervical back: Neck supple. No rigidity.   Lymphadenopathy:      Cervical: No cervical adenopathy.   Skin:     General: Skin is warm and dry.   Neurological:      General: No focal deficit present.      Mental Status: She is alert and oriented to person, place, and time.   Psychiatric:         Mood and Affect: Mood normal.         Behavior: Behavior normal.         Diagnosis and associated orders:     1. Recurrent sinusitis  - doxycycline (VIBRAMYCIN) 100 MG Tab; Take 1 Tablet by mouth 2 times a day for 7 days.  Dispense: 14 Tablet; Refill: 0  - pseudoephedrine SR (SUDAFED 12 HOUR) 120 MG TABLET SR 12 HR; Take 1 Tablet by mouth 2 times a day as needed for Congestion.  Dispense: 14 Tablet; Refill: 0       Comments/MDM:     At this time, I recommend plenty of fluids, Flonase nasal spray, nasal saline washes or maral pot, Ibuprofen or Tylenol for pain, non-drowsy antihistamine such as Zyrtec or Claritin.   Contingent antibiotic prescription given to patient to fill upon meeting criteria of strict guidelines discussed in length.       I personally reviewed prior external notes and test results pertinent to today's visit. Pathogenesis of diagnosis discussed including typical length and natural progression. Supportive care, natural history, differential diagnoses, and indications for immediate follow-up discussed. Patient expresses understanding and agrees to plan. Patient denies any other questions or concerns.     Follow-up with the primary care physician for recheck, reevaluation, and consideration of further management.    Please note that this dictation was created using voice recognition software. I have made a reasonable attempt to  correct obvious errors, but I expect that there are errors of grammar and possibly content that I did not discover before finalizing the note.    This note was electronically signed by Tony Gallardo PA-C

## 2023-08-17 ENCOUNTER — TELEPHONE (OUTPATIENT)
Dept: RHEUMATOLOGY | Facility: MEDICAL CENTER | Age: 23
End: 2023-08-17

## 2023-08-17 DIAGNOSIS — M25.561 CHRONIC PAIN OF RIGHT KNEE: ICD-10-CM

## 2023-08-17 DIAGNOSIS — G89.29 CHRONIC PAIN OF RIGHT KNEE: ICD-10-CM

## 2023-08-17 NOTE — TELEPHONE ENCOUNTER
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    Have submitted a referral to physical therapy as well as to orthopedics.

## 2023-08-21 ENCOUNTER — TELEPHONE (OUTPATIENT)
Dept: RHEUMATOLOGY | Facility: MEDICAL CENTER | Age: 23
End: 2023-08-21
Payer: COMMERCIAL

## 2023-08-21 ENCOUNTER — APPOINTMENT (OUTPATIENT)
Dept: RHEUMATOLOGY | Facility: MEDICAL CENTER | Age: 23
End: 2023-08-21
Attending: INTERNAL MEDICINE
Payer: COMMERCIAL

## 2023-11-27 ENCOUNTER — HOSPITAL ENCOUNTER (EMERGENCY)
Facility: MEDICAL CENTER | Age: 23
End: 2023-11-27
Attending: EMERGENCY MEDICINE
Payer: COMMERCIAL

## 2023-11-27 ENCOUNTER — APPOINTMENT (OUTPATIENT)
Dept: RADIOLOGY | Facility: MEDICAL CENTER | Age: 23
End: 2023-11-27
Attending: EMERGENCY MEDICINE
Payer: COMMERCIAL

## 2023-11-27 VITALS
TEMPERATURE: 97.5 F | HEART RATE: 93 BPM | DIASTOLIC BLOOD PRESSURE: 98 MMHG | HEIGHT: 70 IN | SYSTOLIC BLOOD PRESSURE: 121 MMHG | OXYGEN SATURATION: 98 % | WEIGHT: 293 LBS | BODY MASS INDEX: 41.95 KG/M2 | RESPIRATION RATE: 20 BRPM

## 2023-11-27 DIAGNOSIS — R07.9 CHEST PAIN, UNSPECIFIED TYPE: ICD-10-CM

## 2023-11-27 DIAGNOSIS — R00.2 PALPITATIONS: ICD-10-CM

## 2023-11-27 LAB
ALBUMIN SERPL BCP-MCNC: 4.4 G/DL (ref 3.2–4.9)
ALBUMIN/GLOB SERPL: 1.2 G/DL
ALP SERPL-CCNC: 73 U/L (ref 30–99)
ALT SERPL-CCNC: 25 U/L (ref 2–50)
ANION GAP SERPL CALC-SCNC: 12 MMOL/L (ref 7–16)
AST SERPL-CCNC: 19 U/L (ref 12–45)
BASOPHILS # BLD AUTO: 0.3 % (ref 0–1.8)
BASOPHILS # BLD: 0.05 K/UL (ref 0–0.12)
BILIRUB SERPL-MCNC: 0.4 MG/DL (ref 0.1–1.5)
BUN SERPL-MCNC: 15 MG/DL (ref 8–22)
CALCIUM ALBUM COR SERPL-MCNC: 9.2 MG/DL (ref 8.5–10.5)
CALCIUM SERPL-MCNC: 9.5 MG/DL (ref 8.5–10.5)
CHLORIDE SERPL-SCNC: 103 MMOL/L (ref 96–112)
CO2 SERPL-SCNC: 23 MMOL/L (ref 20–33)
CREAT SERPL-MCNC: 0.78 MG/DL (ref 0.5–1.4)
D DIMER PPP IA.FEU-MCNC: 0.29 UG/ML (FEU) (ref 0–0.5)
EKG IMPRESSION: NORMAL
EOSINOPHIL # BLD AUTO: 0.16 K/UL (ref 0–0.51)
EOSINOPHIL NFR BLD: 1.1 % (ref 0–6.9)
ERYTHROCYTE [DISTWIDTH] IN BLOOD BY AUTOMATED COUNT: 40.3 FL (ref 35.9–50)
GFR SERPLBLD CREATININE-BSD FMLA CKD-EPI: 110 ML/MIN/1.73 M 2
GLOBULIN SER CALC-MCNC: 3.7 G/DL (ref 1.9–3.5)
GLUCOSE SERPL-MCNC: 87 MG/DL (ref 65–99)
HCT VFR BLD AUTO: 44.6 % (ref 37–47)
HGB BLD-MCNC: 15.3 G/DL (ref 12–16)
IMM GRANULOCYTES # BLD AUTO: 0.05 K/UL (ref 0–0.11)
IMM GRANULOCYTES NFR BLD AUTO: 0.3 % (ref 0–0.9)
LIPASE SERPL-CCNC: 15 U/L (ref 11–82)
LYMPHOCYTES # BLD AUTO: 2.89 K/UL (ref 1–4.8)
LYMPHOCYTES NFR BLD: 20 % (ref 22–41)
MCH RBC QN AUTO: 29.6 PG (ref 27–33)
MCHC RBC AUTO-ENTMCNC: 34.3 G/DL (ref 32.2–35.5)
MCV RBC AUTO: 86.3 FL (ref 81.4–97.8)
MONOCYTES # BLD AUTO: 0.64 K/UL (ref 0–0.85)
MONOCYTES NFR BLD AUTO: 4.4 % (ref 0–13.4)
NEUTROPHILS # BLD AUTO: 10.66 K/UL (ref 1.82–7.42)
NEUTROPHILS NFR BLD: 73.9 % (ref 44–72)
NRBC # BLD AUTO: 0 K/UL
NRBC BLD-RTO: 0 /100 WBC (ref 0–0.2)
PLATELET # BLD AUTO: 390 K/UL (ref 164–446)
PMV BLD AUTO: 9.2 FL (ref 9–12.9)
POTASSIUM SERPL-SCNC: 3.9 MMOL/L (ref 3.6–5.5)
PROT SERPL-MCNC: 8.1 G/DL (ref 6–8.2)
RBC # BLD AUTO: 5.17 M/UL (ref 4.2–5.4)
SODIUM SERPL-SCNC: 138 MMOL/L (ref 135–145)
TROPONIN T SERPL-MCNC: <6 NG/L (ref 6–19)
TSH SERPL DL<=0.005 MIU/L-ACNC: 1.55 UIU/ML (ref 0.38–5.33)
WBC # BLD AUTO: 14.5 K/UL (ref 4.8–10.8)

## 2023-11-27 PROCEDURE — 84443 ASSAY THYROID STIM HORMONE: CPT

## 2023-11-27 PROCEDURE — 85025 COMPLETE CBC W/AUTO DIFF WBC: CPT

## 2023-11-27 PROCEDURE — 36415 COLL VENOUS BLD VENIPUNCTURE: CPT

## 2023-11-27 PROCEDURE — 93005 ELECTROCARDIOGRAM TRACING: CPT

## 2023-11-27 PROCEDURE — 71045 X-RAY EXAM CHEST 1 VIEW: CPT

## 2023-11-27 PROCEDURE — 80053 COMPREHEN METABOLIC PANEL: CPT

## 2023-11-27 PROCEDURE — 85379 FIBRIN DEGRADATION QUANT: CPT

## 2023-11-27 PROCEDURE — 93005 ELECTROCARDIOGRAM TRACING: CPT | Performed by: EMERGENCY MEDICINE

## 2023-11-27 PROCEDURE — 84484 ASSAY OF TROPONIN QUANT: CPT

## 2023-11-27 PROCEDURE — 83690 ASSAY OF LIPASE: CPT

## 2023-11-27 PROCEDURE — 99284 EMERGENCY DEPT VISIT MOD MDM: CPT

## 2023-11-27 ASSESSMENT — FIBROSIS 4 INDEX: FIB4 SCORE: 0.26

## 2023-11-28 NOTE — DISCHARGE INSTRUCTIONS
Follow-up with primary care this week for reevaluation, to establish care, for further outpatient workup or referral to cardiology if symptoms persist.    Continue any home medications as previously indicated.    Diet and activity per routine.    Return to the emergency department for persistent or worsening chest pain, intractable palpitations, shortness of breath, syncope, fever, vomiting or other new concerns.

## 2023-11-28 NOTE — ED TRIAGE NOTES
"Chief Complaint   Patient presents with    Chest Pain     Palpitations x a few years, recently developed CP with the palpitations. Had episode Tuesday that was the worst it's ever been. Another episode today.     Palpitations    Back Pain     Upper back pain started today, mild.      Pt ambulatory to triage for above. EKG completed.  BP noted.     BP (!) 135/101   Pulse (!) 103   Temp 36.4 °C (97.5 °F) (Temporal)   Resp 18   Ht 1.778 m (5' 10\")   Wt (!) 158 kg (347 lb 7.1 oz)   SpO2 97%   BMI 49.85 kg/m²     "

## 2023-11-28 NOTE — ED PROVIDER NOTES
"ED Provider Note    CHIEF COMPLAINT  Chief Complaint   Patient presents with    Chest Pain     Palpitations x a few years, recently developed CP with the palpitations. Had episode Tuesday that was the worst it's ever been. Another episode today.     Palpitations    Back Pain     Upper back pain started today, mild.        EXTERNAL RECORDS REVIEWED  Outpatient Notes evaluation for bilateral knee pain, patellofemoral syndrome.  Rheumatology evaluation with history of psoriasis, may have psoriatic arthritis but MRI did not demonstrate this at the right knee.    HPI/ROS  LIMITATION TO HISTORY   Select: : None  OUTSIDE HISTORIAN(S):  Significant other      Jessica Schafer is a 23 y.o. female who presents emergency department through triage with significant other with chest pain, palpitations.  Patient states she has had palpitations intermittently for 5 and 6 years, but since October they have been associated with chest pain.  A \"bad episode\" 1 week ago where she was sitting in her car eating lunch on a break from work, when she developed anterior chest pain, pressure that radiated to her back.  Palpitations during this time.  Palpitations described as heart skipping beats but also sometimes racing.  She felt lightheaded at that time but did not pass out.  No nausea or vomiting.  Denies abdominal pain.  Symptoms were self-limiting but recurred again today after finishing a final at school.  Symptoms lasted longer this time, and patient continues to complain of some anterior chest pressure radiating to her neck.  Also still feeling palpitations.  No syncope.    Obese, on Wegovy for 4 to 6 months without known side effects.  35 pound weight loss during this time.  Tolerating healthy diet, smaller portions.    IUD in place, sexually active, but doubts pregnancy.  Denies abnormal or heavy bleeding.    Denies family history of heart disease, arrhythmia, sudden death.    PAST MEDICAL HISTORY   has a past medical " "history of Asthma.    SURGICAL HISTORY  patient denies any surgical history    FAMILY HISTORY  No family history on file.    SOCIAL HISTORY  Social History     Tobacco Use    Smoking status: Never    Smokeless tobacco: Never   Substance and Sexual Activity    Alcohol use: Not Currently    Drug use: Not Currently    Sexual activity: Not on file       CURRENT MEDICATIONS  Home Medications       Reviewed by Kiran Kumar R.N. (Registered Nurse) on 11/27/23 at 1727  Med List Status: Partial     Medication Last Dose Status   amoxicillin-clavulanate (AUGMENTIN) 875-125 MG Tab  Active   Certolizumab Pegol (CIMZIA PREFILLED SC)  Active   methylPREDNISolone (MEDROL DOSEPAK) 4 MG Tablet Therapy Pack  Active   pseudoephedrine SR (SUDAFED 12 HOUR) 120 MG TABLET SR 12 HR  Active   spironolactone (ALDACTONE) 50 MG Tab  Active   WEGOVY 0.5 MG/0.5ML Solution Auto-injector Pen-injector  Active                    ALLERGIES  No Known Allergies    PHYSICAL EXAM  VITAL SIGNS: BP (!) 121/98   Pulse 93   Temp 36.4 °C (97.5 °F) (Temporal)   Resp 20   Ht 1.778 m (5' 10\")   Wt (!) 158 kg (347 lb 7.1 oz)   SpO2 98%   BMI 49.85 kg/m²    Pulse ox interpretation: I interpret this pulse ox as normal.  Constitutional: Alert in no apparent distress.  Obese.  HENT: Normocephalic, atraumatic. Bilateral external ears normal, Nose normal. Moist mucous membranes.    Eyes: Pupils are equal and reactive, Conjunctiva normal.   Neck: Normal range of motion, Supple no JVD.  Lymphatic: No lymphadenopathy noted.   Cardiovascular: Mild tachycardia otherwise regular rate and rhythm, no murmurs. Distal pulses intact.  No peripheral edema.  Thorax & Lungs: Normal breath sounds.  No wheezing/rales/ronchi. No increased work of breathing, clipped speech or retractions.   Abdomen: Obese, soft, nondistended and nontender to palpation.  Palpable pulsatile mass.  Skin: Warm, Dry, No erythema, No rash.   Musculoskeletal: Good range of motion in all major " joints.  Neurologic: Alert and orient x 4.  Speech clear and cohesive  Psychiatric: Affect normal, Judgment normal, Mood normal.       DIAGNOSTIC STUDIES / PROCEDURES    LABS  Results for orders placed or performed during the hospital encounter of 11/27/23   CBC w/ Differential   Result Value Ref Range    WBC 14.5 (H) 4.8 - 10.8 K/uL    RBC 5.17 4.20 - 5.40 M/uL    Hemoglobin 15.3 12.0 - 16.0 g/dL    Hematocrit 44.6 37.0 - 47.0 %    MCV 86.3 81.4 - 97.8 fL    MCH 29.6 27.0 - 33.0 pg    MCHC 34.3 32.2 - 35.5 g/dL    RDW 40.3 35.9 - 50.0 fL    Platelet Count 390 164 - 446 K/uL    MPV 9.2 9.0 - 12.9 fL    Neutrophils-Polys 73.90 (H) 44.00 - 72.00 %    Lymphocytes 20.00 (L) 22.00 - 41.00 %    Monocytes 4.40 0.00 - 13.40 %    Eosinophils 1.10 0.00 - 6.90 %    Basophils 0.30 0.00 - 1.80 %    Immature Granulocytes 0.30 0.00 - 0.90 %    Nucleated RBC 0.00 0.00 - 0.20 /100 WBC    Neutrophils (Absolute) 10.66 (H) 1.82 - 7.42 K/uL    Lymphs (Absolute) 2.89 1.00 - 4.80 K/uL    Monos (Absolute) 0.64 0.00 - 0.85 K/uL    Eos (Absolute) 0.16 0.00 - 0.51 K/uL    Baso (Absolute) 0.05 0.00 - 0.12 K/uL    Immature Granulocytes (abs) 0.05 0.00 - 0.11 K/uL    NRBC (Absolute) 0.00 K/uL   Complete Metabolic Panel (CMP)   Result Value Ref Range    Sodium 138 135 - 145 mmol/L    Potassium 3.9 3.6 - 5.5 mmol/L    Chloride 103 96 - 112 mmol/L    Co2 23 20 - 33 mmol/L    Anion Gap 12.0 7.0 - 16.0    Glucose 87 65 - 99 mg/dL    Bun 15 8 - 22 mg/dL    Creatinine 0.78 0.50 - 1.40 mg/dL    Calcium 9.5 8.5 - 10.5 mg/dL    Correct Calcium 9.2 8.5 - 10.5 mg/dL    AST(SGOT) 19 12 - 45 U/L    ALT(SGPT) 25 2 - 50 U/L    Alkaline Phosphatase 73 30 - 99 U/L    Total Bilirubin 0.4 0.1 - 1.5 mg/dL    Albumin 4.4 3.2 - 4.9 g/dL    Total Protein 8.1 6.0 - 8.2 g/dL    Globulin 3.7 (H) 1.9 - 3.5 g/dL    A-G Ratio 1.2 g/dL   Troponin - STAT Once   Result Value Ref Range    Troponin T <6 6 - 19 ng/L   Lipase   Result Value Ref Range    Lipase 15 11 - 82 U/L   TSH  (for screening thyroid dysfunction)   Result Value Ref Range    TSH 1.550 0.380 - 5.330 uIU/mL   D-Dimer (only helpful in low pre-test probability wells critieria. Do not order if patient ruled out by PERC criteria. See Weblinks at top of Labs section)   Result Value Ref Range    D-Dimer 0.29 0.00 - 0.50 ug/mL (FEU)   ESTIMATED GFR   Result Value Ref Range    GFR (CKD-EPI) 110 >60 mL/min/1.73 m 2   EKG   Result Value Ref Range    Report       [facility] Emergency Dept.    Test Date:  2023  Pt Name:    SOULEYMANE DIAMOND           Department: ER  MRN:        [id number]                      Room:  Gender:     Female                       Technician: [id number]  :        2000                   Requested By:ER TRIAGE PROTOCOL  Order #:    [id number]                    Reading MD: KANDACE VALENTINO DO    Measurements  Intervals                                Axis  Rate:       103                          P:          30  VT:         136                          QRS:        3  QRSD:       108                          T:          -15  QT:         348  QTc:        456    Interpretive Statements  Sinus tachycardia  Borderline T abnormalities, inferior leads  No previous ECG available for comparison  Electronically Signed On 2023 17:57:19 PST by KANDACE VALENTINO DO       RADIOLOGY  I have independently interpreted the diagnostic imaging associated with this visit and am waiting the final reading from the radiologist.   My preliminary interpretation is as follows:   X-ray: Normal lung fields, no consolidation or effusion    Radiologist interpretation:   DX-CHEST-PORTABLE (1 VIEW)   Final Result      1.  There is no acute cardiopulmonary process.          COURSE & MEDICAL DECISION MAKING    ED Observation Status? Yes; I am placing the patient in to an observation status due to a diagnostic uncertainty as well as therapeutic intensity. Patient placed in observation status at 6:01 PM, 2023.      Observation plan is as follows: Labs, imaging before final disposition can be made    Upon Reevaluation, the patient's condition has: Improved; and will be discharged.    Patient discharged from ED Observation status at 6:57 PM  (Time) 11/27/23 (Date).     INITIAL ASSESSMENT, COURSE AND PLAN  Care Narrative:   Seen evaluated bedside.  Chest pain, palpitations, symptoms still persistent but more mild at this time than onset earlier today.  Patient describes recurrence of the same over the last few months, palpitations for years.  She is on Wegovy but no change in medications or dosages.  Tolerating food and fluids.  No syncope.  Physical exam is unremarkable aside from noted obesity.  She is mildly tachycardic, blood pressure has normalized, no respiratory distress.  Add labs, chest x-ray.    EKG reviewed, nonspecific inferior T wave changes without other evidence for ischemia.  No arrhythmia, ectopy.    Mild nonspecific leukocytosis, WBC 14.5, mild leftward shift without bandemia.  No anemia.  No electrolyte derangement.  Normal LFTs and lipase.  Normal troponin (no indication for repeat given symptom onset around lunchtime today and history of the same previously), normal D-dimer, normal thyroid studies.    Continuous telemetry during ED observation period ectopy or arrhythmia.  Heart rate has normalized to the 80s.    6:54 PM reevaluated bedside.  Symptoms have subsided without ED intervention.  Heart rate has trended downward, blood pressure has normalized.  No acute respiratory distress.    HTN/IDDM FOLLOW UP:  The patient is referred to a primary physician for blood pressure management, diabetic screening, and for all other preventive health concerns    ADDITIONAL PROBLEM LIST  Obesity -on Wegovy  Psoriasis  Chronic knee pain    DISPOSITION AND DISCUSSIONS  ED evaluation for chest pain, palpitations is unrevealing.  Patient's symptoms resolved without ED intervention.  Workup is unrevealing.  Labs are  unremarkable.  No clinical or radiographic evidence for pneumonia, pneumothorax or thoracic arctic dissection.  EKG with nonspecific T wave changes, no ischemia, ectopy or arrhythmia.  Continuous telemetry without ectopy or arrhythmia.  Heart score 2.  Symptomatology, given duration and recurrence, atypical for ACS.  D-dimer negative, no clinical sequela for DVT, doubt PE.  On Wegovy without abdominal pain, vomiting, normal LFTs and lipase.  No evidence for pancreatitis.  Lecture light derangement and tolerating normal diet otherwise.  Outpatient follow-up with primary care with referral to cardiology is encouraged if symptoms persist at which time Holter or Zio patch monitoring may be necessary.    Escalation of care considered, and ultimately not performed:acute inpatient care management, however at this time, the patient is most appropriate for outpatient management    The patient is stable for discharge home, anticipatory guidance provided, continue home medications as previously indicated, close follow-up is encouraged and strict return instructions have been discussed. Patient and her significant other are agreeable to the disposition and plan.      FINAL DIAGNOSIS  1. Chest pain, unspecified type    2. Palpitations           Electronically signed by: Chrystal Romero D.O., 11/27/2023 5:58 PM

## 2023-11-28 NOTE — ED NOTES
Pt given discharge instructions/ home care instructions explained, pt verbalized understanding of instructions given/pt understands the importance of follow up, pt ambulatory to ER lele.

## 2023-11-30 ENCOUNTER — OFFICE VISIT (OUTPATIENT)
Dept: CARDIOLOGY | Facility: MEDICAL CENTER | Age: 23
End: 2023-11-30
Attending: EMERGENCY MEDICINE
Payer: COMMERCIAL

## 2023-11-30 VITALS
OXYGEN SATURATION: 95 % | BODY MASS INDEX: 41.95 KG/M2 | HEART RATE: 95 BPM | HEIGHT: 70 IN | DIASTOLIC BLOOD PRESSURE: 72 MMHG | RESPIRATION RATE: 16 BRPM | SYSTOLIC BLOOD PRESSURE: 120 MMHG | WEIGHT: 293 LBS

## 2023-11-30 DIAGNOSIS — R00.2 PALPITATIONS: ICD-10-CM

## 2023-11-30 DIAGNOSIS — R42 EPISODIC LIGHTHEADEDNESS: ICD-10-CM

## 2023-11-30 DIAGNOSIS — R06.02 SHORTNESS OF BREATH: ICD-10-CM

## 2023-11-30 PROCEDURE — 3074F SYST BP LT 130 MM HG: CPT | Performed by: INTERNAL MEDICINE

## 2023-11-30 PROCEDURE — 99204 OFFICE O/P NEW MOD 45 MIN: CPT | Performed by: INTERNAL MEDICINE

## 2023-11-30 PROCEDURE — 99211 OFF/OP EST MAY X REQ PHY/QHP: CPT | Performed by: INTERNAL MEDICINE

## 2023-11-30 PROCEDURE — 3078F DIAST BP <80 MM HG: CPT | Performed by: INTERNAL MEDICINE

## 2023-11-30 ASSESSMENT — ENCOUNTER SYMPTOMS
COUGH: 0
LOSS OF CONSCIOUSNESS: 0
MYALGIAS: 0
DIZZINESS: 1
PALPITATIONS: 1
SHORTNESS OF BREATH: 1
ORTHOPNEA: 0
PND: 0

## 2023-11-30 ASSESSMENT — FIBROSIS 4 INDEX: FIB4 SCORE: 0.2

## 2023-11-30 NOTE — PROGRESS NOTES
Cardiology Initial Consultation Note    Date of note:    11/30/2023    Primary Care Provider: Pcp Not In Computer  Referring Provider: Chrystal Romero D.O.    Patient Name: Jessica Schafer   YOB: 2000  MRN:              [id number]    Chief Complaint   Patient presents with    Chest Pain       History of Present Illness: Ms. Jessica Schafer is a 23-year-old female with obesity, no significant past cardiac history presents to the cardiology office due to palpitations and episodic lightheadedness.    Patient was recently seen in the emergency department due to worsening palpitations.  She has had these symptoms over the past 5 to 6 years.  However, she has noticed that over the past few weeks her symptoms have progressively gotten worse.  Palpitations described as a skipping sensation but is also experienced her heart racing.  Has had associated lightheadedness but no episodes of syncope.  Also endorses having chest discomfort and dyspnea with her palpitations. Most severe episode of palpitations occurred on Tuesday of last week. Palpitations occurred every few minutes for the entire day.     In the emergency department, she had cardiac enzymes checked and troponins were normal.  EKG shows a sinus tachycardia with a heart rate of 103 bpm and nonspecific ST-T wave changes.  Chest x-ray shows no acute cardiopulmonary process.    Since her ED visit, she states that her symptoms have improved and occur a few times a day.  Denies having exertional chest pain or dyspnea.  No lower extremity edema or episodes of syncope.  She states that she exercises 2 to 3 days out of the week.  Exercise routine includes strength training/weightlifting and some cardiovascular exercises.  Denies having exertional chest pain with exercise.      Cardiovascular Risk Factors:  1. Smoking status: Denies  2. Type II Diabetes Mellitus: Denies   Lab Results   Component Value Date/Time    HBA1C 4.9 04/27/2023 11:46  AM    HBA1C 4.8 02/28/2023 12:26 PM     3. Hypertension: Denies  4. Dyslipidemia: Denies   5. Family history of early Coronary Artery Disease in a first degree relative (Male less than 55 years of age; Female less than 65 years of age): Denies premature CAD in 1st degree relatives. Grandfather with MI at age of 65.      Review of Systems:  Review of Systems   Respiratory:  Positive for shortness of breath. Negative for cough.    Cardiovascular:  Positive for chest pain and palpitations. Negative for orthopnea, leg swelling and PND.   Musculoskeletal:  Negative for joint pain and myalgias.   Neurological:  Positive for dizziness. Negative for loss of consciousness.       Past Medical History:   Diagnosis Date    Asthma          History reviewed. No pertinent surgical history.      Current Outpatient Medications   Medication Sig Dispense Refill    Ixekizumab (TALTZ SC) Inject  under the skin.      WEGOVY 0.5 MG/0.5ML Solution Auto-injector Pen-injector ADMINISTER 0.5 MG UNDER THE SKIN WEEKLY      spironolactone (ALDACTONE) 50 MG Tab Take 50 mg by mouth every day.       No current facility-administered medications for this visit.         No Known Allergies      History reviewed. No pertinent family history.      Social History     Socioeconomic History    Marital status: Single     Spouse name: Not on file    Number of children: Not on file    Years of education: Not on file    Highest education level: Not on file   Occupational History    Not on file   Tobacco Use    Smoking status: Never    Smokeless tobacco: Never   Substance and Sexual Activity    Alcohol use: Not Currently    Drug use: Not Currently    Sexual activity: Not on file   Other Topics Concern    Not on file   Social History Narrative    Not on file     Social Determinants of Health     Financial Resource Strain: Not on file   Food Insecurity: Not on file   Transportation Needs: Not on file   Physical Activity: Not on file   Stress: Not on file   Social  "Connections: Not on file   Intimate Partner Violence: Not on file   Housing Stability: Not on file         Physical Exam:  Ambulatory Vitals  /72 (BP Location: Left arm, Patient Position: Sitting, BP Cuff Size: Adult)   Pulse 95   Resp 16   Ht 1.778 m (5' 10\")   Wt (!) 157 kg (347 lb)   SpO2 95%    Oxygen Therapy:  Pulse Oximetry: 95 %  BP Readings from Last 4 Encounters:   11/30/23 120/72   11/27/23 (!) 121/98   06/12/23 122/70   05/21/23 128/70       Weight/BMI: Body mass index is 49.79 kg/m².  Wt Readings from Last 4 Encounters:   11/30/23 (!) 157 kg (347 lb)   11/27/23 (!) 158 kg (347 lb 7.1 oz)   10/23/23 (!) 161 kg (355 lb)   06/12/23 (!) 159 kg (350 lb)         General: Not in acute distress  Neck:  No carotid bruits, No JVD appreciated  CVS:  RRR, Normal S1, S2. No murmurs, rubs or gallops  Resp: Normal respiratory effort, lungs CTA bilaterally. No rales or rhonchi  Abdomen: Soft, non-distended, non-tender to palpation  Skin: No obvious rashes, no cyanosis  Neurological: Alert and oriented x3, moves all extremities, no focal neurologic deficits  Extremities:   Extremities warm, 2+ bilateral radial pulses.  2+ bilateral dp pulses, no lower extremity edema bilaterally      Lab Data Review:  Lab Results   Component Value Date/Time    CHOLSTRLTOT 204 (H) 02/28/2023 12:19 PM     (H) 02/28/2023 12:19 PM    HDL 43 02/28/2023 12:19 PM    TRIGLYCERIDE 75 02/28/2023 12:19 PM       Lab Results   Component Value Date/Time    SODIUM 138 11/27/2023 06:05 PM    POTASSIUM 3.9 11/27/2023 06:05 PM    CHLORIDE 103 11/27/2023 06:05 PM    CO2 23 11/27/2023 06:05 PM    GLUCOSE 87 11/27/2023 06:05 PM    BUN 15 11/27/2023 06:05 PM    CREATININE 0.78 11/27/2023 06:05 PM     Lab Results   Component Value Date/Time    ALKPHOSPHAT 73 11/27/2023 06:05 PM    ASTSGOT 19 11/27/2023 06:05 PM    ALTSGPT 25 11/27/2023 06:05 PM    TBILIRUBIN 0.4 11/27/2023 06:05 PM      Lab Results   Component Value Date/Time    WBC 14.5 (H) " 11/27/2023 06:05 PM    HEMOGLOBIN 15.3 11/27/2023 06:05 PM     Lab Results   Component Value Date/Time    HBA1C 4.9 04/27/2023 11:46 AM    HBA1C 4.8 02/28/2023 12:26 PM         Cardiac Imaging and Procedures Review:    EKG dated 11/27/2023:   My personal interpretation is sinus rhythm, nonspecific ST-T changes    Assessment & Plan     1. Palpitations  Cardiac Event Monitor    EC-ECHOCARDIOGRAM COMPLETE W/O CONT      2. Episodic lightheadedness  Cardiac Event Monitor    EC-ECHOCARDIOGRAM COMPLETE W/O CONT      3. Shortness of breath  Cardiac Event Monitor            Medical Decision Making:  Ms. Jessica Schafer is a 23-year-old female with obesity, no significant past cardiac history presents to the cardiology office due to palpitations and episodic lightheadedness.    1. Palpitations  2. Episodic lightheadedness  3. Shortness of breath    Ms. Schafer presents to the cardiology office for further evaluation of chronic palpitations that she appears to have gotten worse over the past few weeks.  Most severe episode occurred last week where she was getting palpitations every few minutes lasting the entire day.  With these palpitations, she has experienced episodic lightheadedness, chest discomfort and associated shortness of breath.  Aside from when she is having palpitations, she has not experienced exertional chest pain or dyspnea.  Chest pain symptoms are very atypical and unlikely to represent angina.  She is 23 years of age and aside from obesity, has no significant risk factors for coronary artery disease.    At this time, I recommend that she proceed with additional cardiac work-up for evaluation of palpitations.  I recommend 2-week cardiac monitoring to rule out any sustained arrhythmias as a possible cause of her palpitations.  We will also check an echocardiogram to rule out major cardiac structural abnormalities and assessment of LV function.  TSH recently checked and within normal limits.    It was a  pleasure seeing Ms. Jessica Schafer in the office today. Return if symptoms worsen or fail to improve. Patient is aware to call the cardiology clinic with any questions or concerns.      Skip Manning MD, [facility]  Cardiologist, [facility], [facility]  [address] [address]  [place] [address]  Phone: [phone]  Fax: [phone]    Please note that this dictation was created using voice recognition software. I have made every reasonable attempt to correct obvious errors, but it is possible there are errors of grammar and possibly content that I did not discover before finalizing the note.

## 2023-12-04 ENCOUNTER — NON-PROVIDER VISIT (OUTPATIENT)
Dept: CARDIOLOGY | Facility: MEDICAL CENTER | Age: 23
End: 2023-12-04
Attending: INTERNAL MEDICINE
Payer: COMMERCIAL

## 2023-12-04 DIAGNOSIS — R06.02 SHORTNESS OF BREATH: ICD-10-CM

## 2023-12-04 DIAGNOSIS — R42 EPISODIC LIGHTHEADEDNESS: ICD-10-CM

## 2023-12-04 DIAGNOSIS — R00.2 PALPITATIONS: ICD-10-CM

## 2023-12-05 NOTE — PROGRESS NOTES
Home enrollment completed for the 14 day ePatch Holter monitoring program per Skip Manning MD.  Monitor shipped to patient by [employer].  Pending EOS.  Awaiting data as of 2/21/23.  Monitor not returned to [facility] as of 3/21/24

## 2023-12-08 ENCOUNTER — ANCILLARY PROCEDURE (OUTPATIENT)
Dept: CARDIOLOGY | Facility: MEDICAL CENTER | Age: 23
End: 2023-12-08
Attending: INTERNAL MEDICINE
Payer: COMMERCIAL

## 2023-12-08 DIAGNOSIS — R00.2 PALPITATIONS: ICD-10-CM

## 2023-12-08 DIAGNOSIS — R42 EPISODIC LIGHTHEADEDNESS: ICD-10-CM

## 2023-12-08 LAB — LV EJECT FRACT  99904: 60

## 2023-12-08 PROCEDURE — 93306 TTE W/DOPPLER COMPLETE: CPT | Mod: 26 | Performed by: INTERNAL MEDICINE

## 2023-12-08 PROCEDURE — 93306 TTE W/DOPPLER COMPLETE: CPT

## 2024-01-29 ENCOUNTER — APPOINTMENT (OUTPATIENT)
Dept: MEDICAL GROUP | Facility: IMAGING CENTER | Age: 24
End: 2024-01-29
Payer: COMMERCIAL

## 2024-03-13 ENCOUNTER — HOSPITAL ENCOUNTER (OUTPATIENT)
Dept: LAB | Facility: MEDICAL CENTER | Age: 24
End: 2024-03-13
Attending: DERMATOLOGY

## 2024-03-13 ENCOUNTER — HOSPITAL ENCOUNTER (OUTPATIENT)
Dept: LAB | Facility: MEDICAL CENTER | Age: 24
End: 2024-03-13
Attending: INTERNAL MEDICINE
Payer: COMMERCIAL

## 2024-03-13 LAB
25(OH)D3 SERPL-MCNC: 17 NG/ML (ref 30–100)
ALBUMIN SERPL BCP-MCNC: 4.2 G/DL (ref 3.2–4.9)
ALBUMIN SERPL BCP-MCNC: 4.3 G/DL (ref 3.2–4.9)
ALBUMIN/GLOB SERPL: 1.3 G/DL
ALBUMIN/GLOB SERPL: 1.3 G/DL
ALP SERPL-CCNC: 81 U/L (ref 30–99)
ALP SERPL-CCNC: 83 U/L (ref 30–99)
ALT SERPL-CCNC: 15 U/L (ref 2–50)
ALT SERPL-CCNC: 15 U/L (ref 2–50)
ANION GAP SERPL CALC-SCNC: 11 MMOL/L (ref 7–16)
ANION GAP SERPL CALC-SCNC: 11 MMOL/L (ref 7–16)
AST SERPL-CCNC: 16 U/L (ref 12–45)
AST SERPL-CCNC: 17 U/L (ref 12–45)
BASOPHILS # BLD AUTO: 0.4 % (ref 0–1.8)
BASOPHILS # BLD: 0.03 K/UL (ref 0–0.12)
BILIRUB SERPL-MCNC: 0.2 MG/DL (ref 0.1–1.5)
BILIRUB SERPL-MCNC: 0.3 MG/DL (ref 0.1–1.5)
BUN SERPL-MCNC: 14 MG/DL (ref 8–22)
BUN SERPL-MCNC: 14 MG/DL (ref 8–22)
CALCIUM ALBUM COR SERPL-MCNC: 9 MG/DL (ref 8.5–10.5)
CALCIUM ALBUM COR SERPL-MCNC: 9.2 MG/DL (ref 8.5–10.5)
CALCIUM SERPL-MCNC: 9.2 MG/DL (ref 8.4–10.2)
CALCIUM SERPL-MCNC: 9.4 MG/DL (ref 8.4–10.2)
CHLORIDE SERPL-SCNC: 105 MMOL/L (ref 96–112)
CHLORIDE SERPL-SCNC: 105 MMOL/L (ref 96–112)
CO2 SERPL-SCNC: 23 MMOL/L (ref 20–33)
CO2 SERPL-SCNC: 23 MMOL/L (ref 20–33)
CREAT SERPL-MCNC: 0.65 MG/DL (ref 0.5–1.4)
CREAT SERPL-MCNC: 0.66 MG/DL (ref 0.5–1.4)
EOSINOPHIL # BLD AUTO: 0.07 K/UL (ref 0–0.51)
EOSINOPHIL NFR BLD: 1 % (ref 0–6.9)
ERYTHROCYTE [DISTWIDTH] IN BLOOD BY AUTOMATED COUNT: 39.7 FL (ref 35.9–50)
GFR SERPLBLD CREATININE-BSD FMLA CKD-EPI: 127 ML/MIN/1.73 M 2
GFR SERPLBLD CREATININE-BSD FMLA CKD-EPI: 128 ML/MIN/1.73 M 2
GLOBULIN SER CALC-MCNC: 3.3 G/DL (ref 1.9–3.5)
GLOBULIN SER CALC-MCNC: 3.3 G/DL (ref 1.9–3.5)
GLUCOSE SERPL-MCNC: 104 MG/DL (ref 65–99)
GLUCOSE SERPL-MCNC: 108 MG/DL (ref 65–99)
HCT VFR BLD AUTO: 42.2 % (ref 37–47)
HGB BLD-MCNC: 14.6 G/DL (ref 12–16)
IMM GRANULOCYTES # BLD AUTO: 0.02 K/UL (ref 0–0.11)
IMM GRANULOCYTES NFR BLD AUTO: 0.3 % (ref 0–0.9)
LYMPHOCYTES # BLD AUTO: 1.76 K/UL (ref 1–4.8)
LYMPHOCYTES NFR BLD: 24.5 % (ref 22–41)
MCH RBC QN AUTO: 30.2 PG (ref 27–33)
MCHC RBC AUTO-ENTMCNC: 34.6 G/DL (ref 32.2–35.5)
MCV RBC AUTO: 87.4 FL (ref 81.4–97.8)
MONOCYTES # BLD AUTO: 0.32 K/UL (ref 0–0.85)
MONOCYTES NFR BLD AUTO: 4.5 % (ref 0–13.4)
NEUTROPHILS # BLD AUTO: 4.98 K/UL (ref 1.82–7.42)
NEUTROPHILS NFR BLD: 69.3 % (ref 44–72)
NRBC # BLD AUTO: 0 K/UL
NRBC BLD-RTO: 0 /100 WBC (ref 0–0.2)
PLATELET # BLD AUTO: 320 K/UL (ref 164–446)
PMV BLD AUTO: 9.4 FL (ref 9–12.9)
POTASSIUM SERPL-SCNC: 3.9 MMOL/L (ref 3.6–5.5)
POTASSIUM SERPL-SCNC: 3.9 MMOL/L (ref 3.6–5.5)
PROT SERPL-MCNC: 7.5 G/DL (ref 6–8.2)
PROT SERPL-MCNC: 7.6 G/DL (ref 6–8.2)
RBC # BLD AUTO: 4.83 M/UL (ref 4.2–5.4)
SODIUM SERPL-SCNC: 139 MMOL/L (ref 135–145)
SODIUM SERPL-SCNC: 139 MMOL/L (ref 135–145)
T4 FREE SERPL-MCNC: 1.14 NG/DL (ref 0.93–1.7)
TSH SERPL DL<=0.005 MIU/L-ACNC: 1.45 UIU/ML (ref 0.38–5.33)
WBC # BLD AUTO: 7.2 K/UL (ref 4.8–10.8)

## 2024-03-13 PROCEDURE — 80053 COMPREHEN METABOLIC PANEL: CPT

## 2024-03-13 PROCEDURE — 84443 ASSAY THYROID STIM HORMONE: CPT

## 2024-03-13 PROCEDURE — 85025 COMPLETE CBC W/AUTO DIFF WBC: CPT

## 2024-03-13 PROCEDURE — 36415 COLL VENOUS BLD VENIPUNCTURE: CPT

## 2024-03-13 PROCEDURE — 82306 VITAMIN D 25 HYDROXY: CPT

## 2024-03-13 PROCEDURE — 84439 ASSAY OF FREE THYROXINE: CPT

## 2024-03-13 PROCEDURE — 86480 TB TEST CELL IMMUN MEASURE: CPT

## 2024-03-19 LAB
GAMMA INTERFERON BACKGROUND BLD IA-ACNC: 0.04 IU/ML
M TB IFN-G BLD-IMP: NEGATIVE
M TB IFN-G CD4+ BCKGRND COR BLD-ACNC: -0.01 IU/ML
MITOGEN IGNF BCKGRD COR BLD-ACNC: >10 IU/ML
QFT TB2 - NIL TBQ2: -0.01 IU/ML

## 2024-04-09 ENCOUNTER — TELEPHONE (OUTPATIENT)
Dept: CARDIOLOGY | Facility: MEDICAL CENTER | Age: 24
End: 2024-04-09
Payer: COMMERCIAL

## 2024-04-24 ENCOUNTER — TELEPHONE (OUTPATIENT)
Dept: CARDIOLOGY | Facility: MEDICAL CENTER | Age: 24
End: 2024-04-24
Payer: COMMERCIAL

## 2024-04-25 NOTE — TELEPHONE ENCOUNTER
----- Message from Skip MCCLURE M.D. sent at 4/24/2024  4:53 PM PDT -----  Results of cardiac monitor reviewed.  Please let the patient know that overall, there were no concerning findings.  No sustained arrhythmias.  Noted to have very short, rare episodes of nonsustained SVT which do not require medication initiation.  All of the patient's triggered events correlated with sinus rhythm.  At this time, recommend conservative management.     ANDERSON

## 2024-04-25 NOTE — TELEPHONE ENCOUNTER
S/W pt, relayed findings and feedback as stated by MK. Answered all pt questions, nothing further needed.

## 2024-05-01 PROBLEM — R22.32 MASS OF LEFT WRIST: Status: ACTIVE | Noted: 2024-05-01

## 2024-08-06 ENCOUNTER — OFFICE VISIT (OUTPATIENT)
Dept: MEDICAL GROUP | Facility: MEDICAL CENTER | Age: 24
End: 2024-08-06
Payer: COMMERCIAL

## 2024-08-06 VITALS
WEIGHT: 293 LBS | BODY MASS INDEX: 41.95 KG/M2 | HEIGHT: 70 IN | OXYGEN SATURATION: 96 % | SYSTOLIC BLOOD PRESSURE: 130 MMHG | DIASTOLIC BLOOD PRESSURE: 82 MMHG | HEART RATE: 93 BPM | TEMPERATURE: 97.3 F

## 2024-08-06 DIAGNOSIS — Z11.4 ENCOUNTER FOR SCREENING FOR HIV: ICD-10-CM

## 2024-08-06 DIAGNOSIS — F32.A DEPRESSIVE DISORDER: ICD-10-CM

## 2024-08-06 DIAGNOSIS — Z91.89 AT RISK FOR OBSTRUCTIVE SLEEP APNEA: ICD-10-CM

## 2024-08-06 DIAGNOSIS — R73.09 ELEVATED GLUCOSE LEVEL: ICD-10-CM

## 2024-08-06 DIAGNOSIS — E28.2 PCOS (POLYCYSTIC OVARIAN SYNDROME): ICD-10-CM

## 2024-08-06 DIAGNOSIS — Z13.29 THYROID DISORDER SCREENING: ICD-10-CM

## 2024-08-06 DIAGNOSIS — E66.01 CLASS 3 SEVERE OBESITY DUE TO EXCESS CALORIES WITH SERIOUS COMORBIDITY AND BODY MASS INDEX (BMI) OF 50.0 TO 59.9 IN ADULT (HCC): ICD-10-CM

## 2024-08-06 DIAGNOSIS — Z23 IMMUNIZATION DUE: ICD-10-CM

## 2024-08-06 DIAGNOSIS — F31.70 BIPOLAR DISORDER IN FULL REMISSION, MOST RECENT EPISODE UNSPECIFIED TYPE (HCC): ICD-10-CM

## 2024-08-06 DIAGNOSIS — I10 PRIMARY HYPERTENSION: ICD-10-CM

## 2024-08-06 DIAGNOSIS — J45.20 MILD INTERMITTENT ASTHMA WITHOUT COMPLICATION: ICD-10-CM

## 2024-08-06 DIAGNOSIS — F64.0 GENDER DYSPHORIA IN ADULT: ICD-10-CM

## 2024-08-06 DIAGNOSIS — L40.50 PSORIATIC ARTHRITIS (HCC): ICD-10-CM

## 2024-08-06 DIAGNOSIS — Z83.3 FAMILY HISTORY OF DIABETES MELLITUS (DM): ICD-10-CM

## 2024-08-06 DIAGNOSIS — Z00.00 ENCOUNTER FOR MEDICAL EXAMINATION TO ESTABLISH CARE: Primary | ICD-10-CM

## 2024-08-06 PROBLEM — R42 EPISODIC LIGHTHEADEDNESS: Status: RESOLVED | Noted: 2023-11-30 | Resolved: 2024-08-06

## 2024-08-06 PROBLEM — E66.813 CLASS 3 SEVERE OBESITY DUE TO EXCESS CALORIES WITH BODY MASS INDEX (BMI) OF 50.0 TO 59.9 IN ADULT (HCC): Status: ACTIVE | Noted: 2022-01-20

## 2024-08-06 PROBLEM — J45.909 ASTHMA: Status: ACTIVE | Noted: 2020-01-01

## 2024-08-06 PROBLEM — F41.9 ANXIETY: Status: ACTIVE | Noted: 2022-01-20

## 2024-08-06 PROBLEM — E66.9 OBESITY: Status: ACTIVE | Noted: 2022-01-20

## 2024-08-06 PROBLEM — F31.9 BIPOLAR DISORDER (HCC): Status: ACTIVE | Noted: 2022-01-20

## 2024-08-06 PROBLEM — R22.32 MASS OF LEFT WRIST: Status: RESOLVED | Noted: 2024-05-01 | Resolved: 2024-08-06

## 2024-08-06 LAB
HBA1C MFR BLD: 4.9 % (ref ?–5.8)
POCT INT CON NEG: NEGATIVE
POCT INT CON POS: POSITIVE

## 2024-08-06 PROCEDURE — 90471 IMMUNIZATION ADMIN: CPT | Performed by: FAMILY MEDICINE

## 2024-08-06 PROCEDURE — 90621 MENB-FHBP VACC 2/3 DOSE IM: CPT | Performed by: FAMILY MEDICINE

## 2024-08-06 PROCEDURE — 99214 OFFICE O/P EST MOD 30 MIN: CPT | Mod: 25 | Performed by: FAMILY MEDICINE

## 2024-08-06 PROCEDURE — 3079F DIAST BP 80-89 MM HG: CPT | Performed by: FAMILY MEDICINE

## 2024-08-06 PROCEDURE — 90472 IMMUNIZATION ADMIN EACH ADD: CPT | Performed by: FAMILY MEDICINE

## 2024-08-06 PROCEDURE — 90651 9VHPV VACCINE 2/3 DOSE IM: CPT | Performed by: FAMILY MEDICINE

## 2024-08-06 PROCEDURE — 3075F SYST BP GE 130 - 139MM HG: CPT | Performed by: FAMILY MEDICINE

## 2024-08-06 PROCEDURE — 83036 HEMOGLOBIN GLYCOSYLATED A1C: CPT | Performed by: FAMILY MEDICINE

## 2024-08-06 RX ORDER — TRETINOIN 0.5 MG/G
CREAM TOPICAL NIGHTLY
COMMUNITY
End: 2024-08-06

## 2024-08-06 RX ORDER — SEMAGLUTIDE 0.25 MG/.5ML
0.25 INJECTION, SOLUTION SUBCUTANEOUS
Qty: 2 ML | Refills: 0 | Status: SHIPPED | OUTPATIENT
Start: 2024-08-06 | End: 2024-08-14 | Stop reason: SDUPTHER

## 2024-08-06 ASSESSMENT — FIBROSIS 4 INDEX: FIB4 SCORE: 0.3

## 2024-08-06 NOTE — PROGRESS NOTES
Verbal consent was acquired by the patient to use ePrep ambient listening note generation during this visit: Yes      HPI:    Jessica Schafer is a pleasant 24 y.o. female here to establish care.    History of Present Illness  The patient is a 24-year-old female who is here to establish care.    The patient is currently not on any medication. She resumed taking Cimzia 200 mg two weeks ago for psoriasis. She has a history of anxiety, which is currently well-managed. Her bipolar disorder is stable, and she is not currently on any medication. Her depression is well-managed. She no longer experiences episodes of lightheadedness. She continues to experience palpitations. She has expressed interest in gender-affirming care and is considering egg harvesting.    The patient reports frequent snoring, which obstructs her breathing at night and causes morning fatigue.    SOCIAL HISTORY  She does not use electronic cigarettes, tobacco products, alcohol, or recreational drugs including marijuana.    FAMILY HISTORY  Her mother has high blood pressure and hypothyroidism. Her father has depression, anxiety, and bipolar disorder. She has 1 brother and 1 sister. Her sister has PCOS and depression. Her maternal grandfather had pancreatic cancer and diabetes. Her maternal grandmother  of AML. Her paternal grandmother had COPD, lung issues, and skin cancer.    ALLERGIES  The patient has no known drug allergies.    STOPBANG - Sleep Apnea Screening      Flowsheet Row Most Recent Value   S - Have you been told that you SNORE? Yes   T - Are you often TIRED during the day? Yes   O - Do you know if you stop breathing or has anyone witnessed you stop breathing while you were asleep? (OBSTRUCTION) Yes   P - Do you have high blood PRESSURE or on medication to control high blood pressure? Yes   B - Is your Body Mass Index greater than 35? (BMI) Yes   A - Are you 50 years old or older? (AGE) No   N - Are you a male with a NECK  circumference greater than 17 inches, or a female with a neck circumference greater than 16 inches? Yes   G - Are you male? (GENDER) No   STOPBANG Total Score 6   GEORGINA Risk High Risk              Current medicines (including changes today)  Current Outpatient Medications   Medication Sig Dispense Refill    Certolizumab Pegol 2 X 200 MG Kit Inject  under the skin.      Semaglutide (WEGOVY) 0.25 MG/0.5ML Solution Auto-injector Pen-injector Inject 0.5 mL under the skin every 7 days. 2 mL 0    albuterol 108 (90 Base) MCG/ACT Aero Soln inhalation aerosol albuterol sulfate HFA 90 mcg/actuation aerosol inhaler   Inhale 2 puffs every 4 hours by inhalation route as needed.   Take as needed for shortness of breath and/or wheezing; take before exercise       No current facility-administered medications for this visit.       Past Medical/ Surgical History  She  has a past medical history of Asthma and Mass of left wrist (05/01/2024).  She  has a past surgical history that includes pr exc tumor soft tiss forearm&/wrist subfas* (Left, 7/23/2024).    Social History  Social History     Tobacco Use    Smoking status: Never    Smokeless tobacco: Never   Vaping Use    Vaping status: Never Used   Substance Use Topics    Alcohol use: Not Currently    Drug use: Not Currently     Social History     Social History Narrative    Not on file        Family History  Family History   Problem Relation Age of Onset    Hypertension Mother     Thyroid Mother         hypothyroid    Hypertension Father     Depression Father     Anxiety disorder Father     Bipolar disorder Father     Depression Sister     Other Sister         PCOS    Depression Brother     Other Maternal Grandmother         AML    Diabetes Maternal Grandfather     Cancer Maternal Grandfather         pancreatic    Cancer Paternal Grandmother         skin    Lung Disease Paternal Grandmother         COPD, [facility]     Family Status   Relation Name Status    Mo  Alive    Fa  Alive    Sis  Alive  "   Bro  Alive    MGMo  (Not Specified)    MGFa  (Not Specified)    PGMo  (Not Specified)        Objective:     /82   Pulse 93   Temp 36.3 °C (97.3 °F)   Ht 1.778 m (5' 10\")   Wt (!) 181 kg (400 lb)   SpO2 96%  Body mass index is 57.39 kg/m².    Physical Exam  Constitutional:       General: She is not in acute distress.     Appearance: She is obese.   HENT:      Head: Normocephalic and atraumatic.      Right Ear: Tympanic membrane and external ear normal.      Left Ear: Tympanic membrane and external ear normal.      Nose: No nasal deformity.      Mouth/Throat:      Lips: Pink.      Mouth: Mucous membranes are moist.      Pharynx: Oropharynx is clear. Uvula midline. No posterior oropharyngeal erythema.   Eyes:      General: Lids are normal.      Extraocular Movements: Extraocular movements intact.      Conjunctiva/sclera: Conjunctivae normal.      Pupils: Pupils are equal, round, and reactive to light.   Neck:      Trachea: Trachea normal.   Cardiovascular:      Rate and Rhythm: Normal rate and regular rhythm.      Heart sounds: Normal heart sounds. No murmur heard.     No friction rub. No gallop.   Pulmonary:      Effort: Pulmonary effort is normal. No accessory muscle usage.      Breath sounds: Normal breath sounds. No wheezing or rales.   Abdominal:      General: Bowel sounds are normal.      Palpations: Abdomen is soft.      Tenderness: There is no abdominal tenderness.   Musculoskeletal:      Cervical back: Normal range of motion and neck supple.      Right lower leg: No edema.      Left lower leg: No edema.   Lymphadenopathy:      Cervical: No cervical adenopathy.   Skin:     General: Skin is warm and dry.      Findings: No rash.   Neurological:      General: No focal deficit present.      Mental Status: She is alert and oriented to person, place, and time. Mental status is at baseline.      GCS: GCS eye subscore is 4. GCS verbal subscore is 5. GCS motor subscore is 6.      Motor: No weakness.      " Gait: Gait is intact.   Psychiatric:         Attention and Perception: Attention normal.         Mood and Affect: Mood and affect normal.         Speech: Speech normal.          Results  Laboratory Studies  GFR is normal. Thyroid function is normal. White blood cells are normal. Red blood cells, hemoglobin, hematocrit, and platelet counts are normal. Blood sugar is 104. BUN and creatinine are normal. Calcium levels are normal. Liver enzymes and liver function are normal. A1c is 4.9%.      Assessment and Plan:   The following treatment plan was discussed:     Assessment & Plan  1. Psoriatic arthritis.  This is a chronic, stable condition. Continuation of dermatology follow-up is advised. Continuation of Cimza 200 mg prefilled injectable syringe is recommended to prevent future flares.    2. Mild intermittent asthma without complication.  This is a chronic, and stable condition. Albuterol inhaler is recommended for use as needed for shortness of breath.    3. Bipolar disorder.  This is a stable chronic condition. Continuation of symptoms for any flare-ups is advised.    4. Depressive disorder.  This chronic, stable condition. Continued monitoring of depression levels is advised.    5. Polycystic ovary syndrome.  This is a chronic and stable condition. A referral to OB/GYN has been sent for the patient, and she is also interested in egg harvesting. I discussed that OB/GYN will be the best route for her. She is also due for her cervical cancer screening and chlamydia, gonorrhea screening.    6. Gender dysphoria in adult.  This is a chronic, stable condition. The patient expressed interest in changing her care to [place] in 01/2024. A referral to [facility] for Dr. Sunita Perry has been sent to get her started on that process.    7. Severe obesity.  This is a chronic, unstable condition. We are trying to get the patient on Wegovy. I am concerned that if her weight continues, she will eventually become  diabetic and worsen blood pressure. I feel that this is also contributing to possible obstructive sleep apnea. We will work on trying to get her on weight loss medication.    8. Primary hypertension.  This is a chronic, stable condition. Blood pressure today is 130/82. I do suspect that she has type 1 hypertension. I do feel that weight reduction will help prevent her from having hypertension and medications in the future.    9. Elevated glucose level.  A POCT A1c showed an A1c today of 4.9%. Her sugar levels will continue to be monitored, as she does have a strong family history for diabetes.    10. At risk for obstructive sleep apnea.  She does report that she frequently snores, does obstruct her at night, and she is fatigued in the morning. We will work on trying to get a home sleep study to confirm this diagnosis. She is also due for several immunizations. She will get her Gardasil and Trumenba updated today.    Follow-up  A follow-up visit is scheduled in 3 months.  Jessica was seen today for establish care, weight loss and new med request.    Diagnoses and all orders for this visit:    Encounter for medical examination to establish care    Psoriatic arthritis ([facility])    Mild intermittent asthma without complication    Bipolar disorder in full remission, most recent episode unspecified type ([facility])    Depressive disorder    PCOS (polycystic ovarian syndrome)  -     Semaglutide (WEGOVY) 0.25 MG/0.5ML Solution Auto-injector Pen-injector; Inject 0.5 mL under the skin every 7 days.  -     Referral to Nutrition Services  -     Referral to OB/Gyn    Gender dysphoria in adult  -     Referral to Other    Class 3 severe obesity due to excess calories with serious comorbidity and body mass index (BMI) of 50.0 to 59.9 in adult ([facility])  -     Semaglutide (WEGOVY) 0.25 MG/0.5ML Solution Auto-injector Pen-injector; Inject 0.5 mL under the skin every 7 days.  -     Referral to Nutrition Services    Primary hypertension  -      Semaglutide (WEGOVY) 0.25 MG/0.5ML Solution Auto-injector Pen-injector; Inject 0.5 mL under the skin every 7 days.  -     Referral to Nutrition Services  -     CBC WITH DIFFERENTIAL; Future  -     Comp Metabolic Panel; Future  -     ESTIMATED GFR; Future  -     Lipid Profile; Future    Family history of diabetes mellitus (DM)  -     Semaglutide (WEGOVY) 0.25 MG/0.5ML Solution Auto-injector Pen-injector; Inject 0.5 mL under the skin every 7 days.  -     Referral to Nutrition Services  -     POCT  A1C    Elevated glucose level  -     Semaglutide (WEGOVY) 0.25 MG/0.5ML Solution Auto-injector Pen-injector; Inject 0.5 mL under the skin every 7 days.  -     Referral to Nutrition Services  -     POCT  A1C  -     Comp Metabolic Panel; Future  -     ESTIMATED GFR; Future  -     Lipid Profile; Future  -     HEMOGLOBIN A1C; Future    At risk for obstructive sleep apnea  -     Semaglutide (WEGOVY) 0.25 MG/0.5ML Solution Auto-injector Pen-injector; Inject 0.5 mL under the skin every 7 days.  -     Referral to Nutrition Services  -     Overnight Home Sleep Study; Future    Immunization due  -     Gardasil 9  -     Meningococcal (IM) Group B    Thyroid disorder screening  -     TSH+FREE T4    Encounter for screening for HIV  -     HIV AG/AB COMBO ASSAY SCREENING; Future      Followup: Return in about 3 months (around 11/6/2024) for Follow-up on weight and labs.    I have placed vaccination orders.  The MA is preforming vaccination orders under the direction of Dr. Jacob    Please note that this dictation was created using voice recognition software. I have made every reasonable attempt to correct obvious errors, but I expect that there are errors of grammar and possibly content that I did not discover before finalizing the note.

## 2024-08-14 DIAGNOSIS — Z83.3 FAMILY HISTORY OF DIABETES MELLITUS (DM): ICD-10-CM

## 2024-08-14 DIAGNOSIS — E28.2 PCOS (POLYCYSTIC OVARIAN SYNDROME): ICD-10-CM

## 2024-08-14 DIAGNOSIS — R73.09 ELEVATED GLUCOSE LEVEL: ICD-10-CM

## 2024-08-14 DIAGNOSIS — Z91.89 AT RISK FOR OBSTRUCTIVE SLEEP APNEA: ICD-10-CM

## 2024-08-14 DIAGNOSIS — I10 PRIMARY HYPERTENSION: ICD-10-CM

## 2024-08-14 DIAGNOSIS — E66.01 CLASS 3 SEVERE OBESITY DUE TO EXCESS CALORIES WITH SERIOUS COMORBIDITY AND BODY MASS INDEX (BMI) OF 50.0 TO 59.9 IN ADULT (HCC): ICD-10-CM

## 2024-08-14 NOTE — TELEPHONE ENCOUNTER
Patient needs Prior Auth for medication    Received request via: Patient    Was the patient seen in the last year in this department? Yes    Does the patient have an active prescription (recently filled or refills available) for medication(s) requested?  Yes    Pharmacy Name: [facility] in [place]    Does the patient have retirement Plus and need 100-day supply? (This applies to ALL medications) Patient does not have SCP

## 2024-08-15 RX ORDER — SEMAGLUTIDE 0.25 MG/.5ML
0.25 INJECTION, SOLUTION SUBCUTANEOUS
Qty: 2 ML | Refills: 0 | Status: SHIPPED | OUTPATIENT
Start: 2024-08-15

## 2024-08-29 ENCOUNTER — TELEPHONE (OUTPATIENT)
Dept: HEALTH INFORMATION MANAGEMENT | Facility: OTHER | Age: 24
End: 2024-08-29
Payer: COMMERCIAL

## 2024-09-09 DIAGNOSIS — E28.2 PCOS (POLYCYSTIC OVARIAN SYNDROME): ICD-10-CM

## 2024-09-09 DIAGNOSIS — I10 PRIMARY HYPERTENSION: ICD-10-CM

## 2024-09-09 DIAGNOSIS — Z91.89 AT RISK FOR OBSTRUCTIVE SLEEP APNEA: ICD-10-CM

## 2024-09-09 DIAGNOSIS — R73.09 ELEVATED GLUCOSE LEVEL: ICD-10-CM

## 2024-09-09 DIAGNOSIS — Z83.3 FAMILY HISTORY OF DIABETES MELLITUS (DM): ICD-10-CM

## 2024-09-09 DIAGNOSIS — E66.01 CLASS 3 SEVERE OBESITY DUE TO EXCESS CALORIES WITH SERIOUS COMORBIDITY AND BODY MASS INDEX (BMI) OF 50.0 TO 59.9 IN ADULT (HCC): ICD-10-CM

## 2024-09-09 RX ORDER — SEMAGLUTIDE 1 MG/.5ML
1 INJECTION, SOLUTION SUBCUTANEOUS
Qty: 2 ML | Refills: 0 | Status: SHIPPED | OUTPATIENT
Start: 2024-09-09

## 2024-09-09 RX ORDER — SEMAGLUTIDE 0.25 MG/.5ML
0.25 INJECTION, SOLUTION SUBCUTANEOUS
Qty: 2 ML | Refills: 0 | Status: CANCELLED | OUTPATIENT
Start: 2024-09-09

## 2024-09-27 ENCOUNTER — PATIENT MESSAGE (OUTPATIENT)
Dept: CARDIOLOGY | Facility: MEDICAL CENTER | Age: 24
End: 2024-09-27
Payer: COMMERCIAL

## 2024-10-15 PROBLEM — R22.32 MASS OF LEFT WRIST: Status: ACTIVE | Noted: 2024-10-15

## 2024-10-22 ENCOUNTER — PHYSICAL THERAPY (OUTPATIENT)
Dept: PHYSICAL THERAPY | Facility: REHABILITATION | Age: 24
End: 2024-10-22
Attending: PHYSICIAN ASSISTANT
Payer: COMMERCIAL

## 2024-10-22 DIAGNOSIS — M25.562 ACUTE PAIN OF LEFT KNEE: ICD-10-CM

## 2024-10-22 DIAGNOSIS — M22.2X2 PATELLOFEMORAL PAIN SYNDROME OF LEFT KNEE: ICD-10-CM

## 2024-10-22 PROCEDURE — 97162 PT EVAL MOD COMPLEX 30 MIN: CPT

## 2024-10-22 PROCEDURE — 97535 SELF CARE MNGMENT TRAINING: CPT

## 2024-10-22 ASSESSMENT — ENCOUNTER SYMPTOMS
PAIN SCALE: 4
PAIN SCALE AT HIGHEST: 8
PAIN SCALE AT LOWEST: 0

## 2024-10-24 ENCOUNTER — PHYSICAL THERAPY (OUTPATIENT)
Dept: PHYSICAL THERAPY | Facility: REHABILITATION | Age: 24
End: 2024-10-24
Attending: PHYSICIAN ASSISTANT
Payer: COMMERCIAL

## 2024-10-24 DIAGNOSIS — M25.562 PAIN IN BOTH KNEES, UNSPECIFIED CHRONICITY: ICD-10-CM

## 2024-10-24 DIAGNOSIS — M25.561 PAIN IN BOTH KNEES, UNSPECIFIED CHRONICITY: ICD-10-CM

## 2024-10-24 PROCEDURE — 97110 THERAPEUTIC EXERCISES: CPT

## 2024-10-29 ENCOUNTER — HOSPITAL ENCOUNTER (OUTPATIENT)
Dept: LAB | Facility: MEDICAL CENTER | Age: 24
End: 2024-10-29
Attending: FAMILY MEDICINE
Payer: COMMERCIAL

## 2024-10-29 ENCOUNTER — HOSPITAL ENCOUNTER (OUTPATIENT)
Dept: LAB | Facility: MEDICAL CENTER | Age: 24
End: 2024-10-29
Attending: STUDENT IN AN ORGANIZED HEALTH CARE EDUCATION/TRAINING PROGRAM
Payer: COMMERCIAL

## 2024-10-29 ENCOUNTER — OFFICE VISIT (OUTPATIENT)
Dept: OBGYN | Facility: CLINIC | Age: 24
End: 2024-10-29
Payer: COMMERCIAL

## 2024-10-29 ENCOUNTER — HOSPITAL ENCOUNTER (OUTPATIENT)
Facility: MEDICAL CENTER | Age: 24
End: 2024-10-29
Attending: STUDENT IN AN ORGANIZED HEALTH CARE EDUCATION/TRAINING PROGRAM
Payer: COMMERCIAL

## 2024-10-29 ENCOUNTER — APPOINTMENT (OUTPATIENT)
Dept: MEDICAL GROUP | Facility: MEDICAL CENTER | Age: 24
End: 2024-10-29
Payer: COMMERCIAL

## 2024-10-29 VITALS
DIASTOLIC BLOOD PRESSURE: 78 MMHG | SYSTOLIC BLOOD PRESSURE: 124 MMHG | WEIGHT: 293 LBS | BODY MASS INDEX: 41.95 KG/M2 | HEIGHT: 70 IN

## 2024-10-29 DIAGNOSIS — Z11.1 SCREENING FOR TUBERCULOSIS: ICD-10-CM

## 2024-10-29 DIAGNOSIS — E28.2 PCOS (POLYCYSTIC OVARIAN SYNDROME): ICD-10-CM

## 2024-10-29 DIAGNOSIS — Z00.00 WELLNESS EXAMINATION: ICD-10-CM

## 2024-10-29 DIAGNOSIS — Z11.4 ENCOUNTER FOR SCREENING FOR HIV: ICD-10-CM

## 2024-10-29 DIAGNOSIS — I10 PRIMARY HYPERTENSION: ICD-10-CM

## 2024-10-29 DIAGNOSIS — R73.09 ELEVATED GLUCOSE LEVEL: ICD-10-CM

## 2024-10-29 LAB
ALBUMIN SERPL BCP-MCNC: 4.1 G/DL (ref 3.2–4.9)
ALBUMIN SERPL BCP-MCNC: 4.2 G/DL (ref 3.2–4.9)
ALBUMIN/GLOB SERPL: 1.2 G/DL
ALP SERPL-CCNC: 85 U/L (ref 30–99)
ALP SERPL-CCNC: 85 U/L (ref 30–99)
ALT SERPL-CCNC: 30 U/L (ref 2–50)
ALT SERPL-CCNC: 31 U/L (ref 2–50)
ANION GAP SERPL CALC-SCNC: 13 MMOL/L (ref 7–16)
AST SERPL-CCNC: 23 U/L (ref 12–45)
AST SERPL-CCNC: 24 U/L (ref 12–45)
BASOPHILS # BLD AUTO: 0.4 % (ref 0–1.8)
BASOPHILS # BLD AUTO: 0.4 % (ref 0–1.8)
BASOPHILS # BLD: 0.04 K/UL (ref 0–0.12)
BASOPHILS # BLD: 0.04 K/UL (ref 0–0.12)
BILIRUB CONJ SERPL-MCNC: <0.2 MG/DL (ref 0.1–0.5)
BILIRUB INDIRECT SERPL-MCNC: NORMAL MG/DL (ref 0–1)
BILIRUB SERPL-MCNC: 0.4 MG/DL (ref 0.1–1.5)
BILIRUB SERPL-MCNC: 0.4 MG/DL (ref 0.1–1.5)
BUN SERPL-MCNC: 15 MG/DL (ref 8–22)
CALCIUM ALBUM COR SERPL-MCNC: 9.4 MG/DL (ref 8.5–10.5)
CALCIUM SERPL-MCNC: 9.5 MG/DL (ref 8.4–10.2)
CHLORIDE SERPL-SCNC: 102 MMOL/L (ref 96–112)
CHOLEST SERPL-MCNC: 204 MG/DL (ref 100–199)
CO2 SERPL-SCNC: 24 MMOL/L (ref 20–33)
CREAT SERPL-MCNC: 0.76 MG/DL (ref 0.5–1.4)
EOSINOPHIL # BLD AUTO: 0.06 K/UL (ref 0–0.51)
EOSINOPHIL # BLD AUTO: 0.06 K/UL (ref 0–0.51)
EOSINOPHIL NFR BLD: 0.6 % (ref 0–6.9)
EOSINOPHIL NFR BLD: 0.6 % (ref 0–6.9)
ERYTHROCYTE [DISTWIDTH] IN BLOOD BY AUTOMATED COUNT: 41 FL (ref 35.9–50)
ERYTHROCYTE [DISTWIDTH] IN BLOOD BY AUTOMATED COUNT: 41.1 FL (ref 35.9–50)
GFR SERPLBLD CREATININE-BSD FMLA CKD-EPI: 113 ML/MIN/1.73 M 2
GLOBULIN SER CALC-MCNC: 3.3 G/DL (ref 1.9–3.5)
GLUCOSE SERPL-MCNC: 89 MG/DL (ref 65–99)
HBV SURFACE AG SER QL: NORMAL
HCT VFR BLD AUTO: 43.6 % (ref 37–47)
HCT VFR BLD AUTO: 44 % (ref 37–47)
HCV AB SER QL: NORMAL
HDLC SERPL-MCNC: 42 MG/DL
HGB BLD-MCNC: 14.9 G/DL (ref 12–16)
HGB BLD-MCNC: 15 G/DL (ref 12–16)
HIV 1+2 AB+HIV1 P24 AG SERPL QL IA: NORMAL
HIV 1+2 AB+HIV1 P24 AG SERPL QL IA: NORMAL
IMM GRANULOCYTES # BLD AUTO: 0.04 K/UL (ref 0–0.11)
IMM GRANULOCYTES # BLD AUTO: 0.06 K/UL (ref 0–0.11)
IMM GRANULOCYTES NFR BLD AUTO: 0.4 % (ref 0–0.9)
IMM GRANULOCYTES NFR BLD AUTO: 0.6 % (ref 0–0.9)
LDLC SERPL CALC-MCNC: 143 MG/DL
LYMPHOCYTES # BLD AUTO: 2.22 K/UL (ref 1–4.8)
LYMPHOCYTES # BLD AUTO: 2.42 K/UL (ref 1–4.8)
LYMPHOCYTES NFR BLD: 21.2 % (ref 22–41)
LYMPHOCYTES NFR BLD: 22.8 % (ref 22–41)
MCH RBC QN AUTO: 29.9 PG (ref 27–33)
MCH RBC QN AUTO: 30.2 PG (ref 27–33)
MCHC RBC AUTO-ENTMCNC: 34.1 G/DL (ref 32.2–35.5)
MCHC RBC AUTO-ENTMCNC: 34.2 G/DL (ref 32.2–35.5)
MCV RBC AUTO: 87.4 FL (ref 81.4–97.8)
MCV RBC AUTO: 88.5 FL (ref 81.4–97.8)
MONOCYTES # BLD AUTO: 0.47 K/UL (ref 0–0.85)
MONOCYTES # BLD AUTO: 0.54 K/UL (ref 0–0.85)
MONOCYTES NFR BLD AUTO: 4.4 % (ref 0–13.4)
MONOCYTES NFR BLD AUTO: 5.2 % (ref 0–13.4)
NEUTROPHILS # BLD AUTO: 7.54 K/UL (ref 1.82–7.42)
NEUTROPHILS # BLD AUTO: 7.57 K/UL (ref 1.82–7.42)
NEUTROPHILS NFR BLD: 71.4 % (ref 44–72)
NEUTROPHILS NFR BLD: 72 % (ref 44–72)
NRBC # BLD AUTO: 0 K/UL
NRBC # BLD AUTO: 0 K/UL
NRBC BLD-RTO: 0 /100 WBC (ref 0–0.2)
NRBC BLD-RTO: 0 /100 WBC (ref 0–0.2)
PLATELET # BLD AUTO: 347 K/UL (ref 164–446)
PLATELET # BLD AUTO: 350 K/UL (ref 164–446)
PMV BLD AUTO: 9.3 FL (ref 9–12.9)
PMV BLD AUTO: 9.6 FL (ref 9–12.9)
POTASSIUM SERPL-SCNC: 3.7 MMOL/L (ref 3.6–5.5)
PROT SERPL-MCNC: 7.4 G/DL (ref 6–8.2)
PROT SERPL-MCNC: 7.5 G/DL (ref 6–8.2)
RBC # BLD AUTO: 4.97 M/UL (ref 4.2–5.4)
RBC # BLD AUTO: 4.99 M/UL (ref 4.2–5.4)
SODIUM SERPL-SCNC: 139 MMOL/L (ref 135–145)
T PALLIDUM AB SER QL IA: NORMAL
T4 FREE SERPL-MCNC: 1.15 NG/DL (ref 0.93–1.7)
TRIGL SERPL-MCNC: 97 MG/DL (ref 0–149)
TSH SERPL DL<=0.005 MIU/L-ACNC: 1.25 UIU/ML (ref 0.38–5.33)
WBC # BLD AUTO: 10.5 K/UL (ref 4.8–10.8)
WBC # BLD AUTO: 10.6 K/UL (ref 4.8–10.8)

## 2024-10-29 PROCEDURE — 87389 HIV-1 AG W/HIV-1&-2 AB AG IA: CPT | Mod: 91

## 2024-10-29 PROCEDURE — 87340 HEPATITIS B SURFACE AG IA: CPT

## 2024-10-29 PROCEDURE — 86480 TB TEST CELL IMMUN MEASURE: CPT

## 2024-10-29 PROCEDURE — 86803 HEPATITIS C AB TEST: CPT

## 2024-10-29 PROCEDURE — 86780 TREPONEMA PALLIDUM: CPT

## 2024-10-29 PROCEDURE — 85025 COMPLETE CBC W/AUTO DIFF WBC: CPT

## 2024-10-29 PROCEDURE — 84443 ASSAY THYROID STIM HORMONE: CPT

## 2024-10-29 PROCEDURE — 87591 N.GONORRHOEAE DNA AMP PROB: CPT

## 2024-10-29 PROCEDURE — 80061 LIPID PANEL: CPT

## 2024-10-29 PROCEDURE — 84439 ASSAY OF FREE THYROXINE: CPT

## 2024-10-29 PROCEDURE — 83036 HEMOGLOBIN GLYCOSYLATED A1C: CPT

## 2024-10-29 PROCEDURE — 36415 COLL VENOUS BLD VENIPUNCTURE: CPT

## 2024-10-29 PROCEDURE — 88142 CYTOPATH C/V THIN LAYER: CPT

## 2024-10-29 PROCEDURE — 87491 CHLMYD TRACH DNA AMP PROBE: CPT

## 2024-10-29 PROCEDURE — 87389 HIV-1 AG W/HIV-1&-2 AB AG IA: CPT

## 2024-10-29 PROCEDURE — 80076 HEPATIC FUNCTION PANEL: CPT

## 2024-10-29 PROCEDURE — 80053 COMPREHEN METABOLIC PANEL: CPT

## 2024-10-29 PROCEDURE — 85025 COMPLETE CBC W/AUTO DIFF WBC: CPT | Mod: 91

## 2024-10-29 RX ORDER — CERTOLIZUMAB PEGOL 200 MG/ML
INJECTION, SOLUTION SUBCUTANEOUS
COMMUNITY
Start: 2024-10-28

## 2024-10-29 ASSESSMENT — FIBROSIS 4 INDEX
FIB4 SCORE: 0.3
FIB4 SCORE: 0.3

## 2024-10-30 LAB
C TRACH DNA GENITAL QL NAA+PROBE: NEGATIVE
EST. AVERAGE GLUCOSE BLD GHB EST-MCNC: 94 MG/DL
GAMMA INTERFERON BACKGROUND BLD IA-ACNC: 0.03 IU/ML
HBA1C MFR BLD: 4.9 % (ref 4–5.6)
M TB IFN-G BLD-IMP: NEGATIVE
M TB IFN-G CD4+ BCKGRND COR BLD-ACNC: 0.01 IU/ML
MITOGEN IGNF BCKGRD COR BLD-ACNC: >10 IU/ML
N GONORRHOEA DNA GENITAL QL NAA+PROBE: NEGATIVE
QFT TB2 - NIL TBQ2: 0 IU/ML
SPECIMEN SOURCE: NORMAL

## 2024-10-31 ENCOUNTER — APPOINTMENT (OUTPATIENT)
Dept: PHYSICAL THERAPY | Facility: REHABILITATION | Age: 24
End: 2024-10-31
Attending: PHYSICIAN ASSISTANT
Payer: COMMERCIAL

## 2024-11-04 DIAGNOSIS — Z91.89 AT RISK FOR OBSTRUCTIVE SLEEP APNEA: ICD-10-CM

## 2024-11-04 DIAGNOSIS — E66.01 CLASS 3 SEVERE OBESITY DUE TO EXCESS CALORIES WITH SERIOUS COMORBIDITY AND BODY MASS INDEX (BMI) OF 50.0 TO 59.9 IN ADULT (HCC): ICD-10-CM

## 2024-11-04 DIAGNOSIS — Z83.3 FAMILY HISTORY OF DIABETES MELLITUS (DM): ICD-10-CM

## 2024-11-04 DIAGNOSIS — E66.813 CLASS 3 SEVERE OBESITY DUE TO EXCESS CALORIES WITH SERIOUS COMORBIDITY AND BODY MASS INDEX (BMI) OF 50.0 TO 59.9 IN ADULT (HCC): ICD-10-CM

## 2024-11-04 DIAGNOSIS — E28.2 PCOS (POLYCYSTIC OVARIAN SYNDROME): ICD-10-CM

## 2024-11-04 DIAGNOSIS — I10 PRIMARY HYPERTENSION: ICD-10-CM

## 2024-11-04 DIAGNOSIS — R73.09 ELEVATED GLUCOSE LEVEL: ICD-10-CM

## 2024-11-05 ENCOUNTER — PHYSICAL THERAPY (OUTPATIENT)
Dept: PHYSICAL THERAPY | Facility: REHABILITATION | Age: 24
End: 2024-11-05
Attending: PHYSICIAN ASSISTANT
Payer: COMMERCIAL

## 2024-11-05 DIAGNOSIS — M25.561 PAIN IN BOTH KNEES, UNSPECIFIED CHRONICITY: ICD-10-CM

## 2024-11-05 DIAGNOSIS — M25.562 PAIN IN BOTH KNEES, UNSPECIFIED CHRONICITY: ICD-10-CM

## 2024-11-05 PROCEDURE — 97110 THERAPEUTIC EXERCISES: CPT

## 2024-11-05 RX ORDER — SEMAGLUTIDE 1 MG/.5ML
1 INJECTION, SOLUTION SUBCUTANEOUS
Qty: 2 ML | Refills: 0 | Status: SHIPPED | OUTPATIENT
Start: 2024-11-05

## 2024-11-05 NOTE — OP THERAPY DAILY TREATMENT
"  Outpatient Physical Therapy  DAILY TREATMENT     [facility] Physical [address]  [address]  [place] [address]  Phone:  [phone]  Fax:  [phone]    Date: 11/05/2024    Patient: Jessica Schafer (*preference \"Rick\")  YOB: 2000  MRN: [id number]     Time Calculation    Start time: 0955  Stop time: 1035 Time Calculation (min): 40 minutes         Chief Complaint: Knee Problem    Visit #: 3    SUBJECTIVE:  Stairs are biggest problem, lives in 2 story \"anything with bending the knee with full body weight is painful\", requests not using upright stationary bike due to causing ganglion cyst when doing this at previous PT    OBJECTIVE:    Therapeutic Exercises (CPT 48756):     1. rec bike, 5min warm up, no upright bike-caused ganglion cyst    2. shuttle DL with hip abd, to fatigue, 7c    3. shuttle SL, to fatigue ea, 2 sets, 7c    4. Attempt lat weight shifts and SLB with hand support, not tolerated (7/10 pain)    5. Bridge with marching, 8x, difficult maintaining pelvic stability    6. Lat walk purple TB, 4x5 steps ea      Time-based treatments/modalities:    Physical Therapy Timed Treatment Charges  Therapeutic exercise minutes (CPT 47585): 40 minutes    ASSESSMENT:   Response to treatment: Pt does not tolerated SL full weight bearing therex, plan to progress from DL standing and SL at mat level to SL standing therex gradually; noted to have abdominal weakness limiting performance of SL bridges and bridge with marching    PLAN/RECOMMENDATIONS:   Plan for treatment: therapy treatment to continue next visit.  Planned interventions for next visit: continue with current treatment.         "

## 2024-11-05 NOTE — TELEPHONE ENCOUNTER
Patient comment: The pharmacy accidentally gave me 1mg wegovy and I’ve been taking it for the past few weeks. Could you put in another order for 1mg instead of 0.5mg. The 0.5mg is out of stock. Thank you!

## 2024-11-07 ENCOUNTER — PHYSICAL THERAPY (OUTPATIENT)
Dept: PHYSICAL THERAPY | Facility: REHABILITATION | Age: 24
End: 2024-11-07
Attending: PHYSICIAN ASSISTANT
Payer: COMMERCIAL

## 2024-11-07 ENCOUNTER — OFFICE VISIT (OUTPATIENT)
Dept: MEDICAL GROUP | Facility: MEDICAL CENTER | Age: 24
End: 2024-11-07
Payer: COMMERCIAL

## 2024-11-07 ENCOUNTER — PATIENT MESSAGE (OUTPATIENT)
Dept: MEDICAL GROUP | Facility: MEDICAL CENTER | Age: 24
End: 2024-11-07

## 2024-11-07 VITALS
OXYGEN SATURATION: 98 % | WEIGHT: 293 LBS | TEMPERATURE: 97.3 F | SYSTOLIC BLOOD PRESSURE: 128 MMHG | BODY MASS INDEX: 41.95 KG/M2 | HEIGHT: 70 IN | DIASTOLIC BLOOD PRESSURE: 72 MMHG | HEART RATE: 78 BPM

## 2024-11-07 DIAGNOSIS — I10 PRIMARY HYPERTENSION: ICD-10-CM

## 2024-11-07 DIAGNOSIS — Z91.89 AT RISK FOR OBSTRUCTIVE SLEEP APNEA: ICD-10-CM

## 2024-11-07 DIAGNOSIS — E66.01 CLASS 3 SEVERE OBESITY DUE TO EXCESS CALORIES WITH SERIOUS COMORBIDITY AND BODY MASS INDEX (BMI) OF 50.0 TO 59.9 IN ADULT (HCC): ICD-10-CM

## 2024-11-07 DIAGNOSIS — E66.813 CLASS 3 SEVERE OBESITY DUE TO EXCESS CALORIES WITH SERIOUS COMORBIDITY AND BODY MASS INDEX (BMI) OF 50.0 TO 59.9 IN ADULT (HCC): ICD-10-CM

## 2024-11-07 DIAGNOSIS — E28.2 PCOS (POLYCYSTIC OVARIAN SYNDROME): ICD-10-CM

## 2024-11-07 DIAGNOSIS — T50.905A MEDICATION SIDE EFFECT, INITIAL ENCOUNTER: ICD-10-CM

## 2024-11-07 DIAGNOSIS — M25.561 PAIN IN BOTH KNEES, UNSPECIFIED CHRONICITY: ICD-10-CM

## 2024-11-07 DIAGNOSIS — Z23 IMMUNIZATION DUE: ICD-10-CM

## 2024-11-07 DIAGNOSIS — M25.562 PAIN IN BOTH KNEES, UNSPECIFIED CHRONICITY: ICD-10-CM

## 2024-11-07 DIAGNOSIS — Z83.3 FAMILY HISTORY OF DIABETES MELLITUS (DM): ICD-10-CM

## 2024-11-07 DIAGNOSIS — R73.09 ELEVATED GLUCOSE LEVEL: ICD-10-CM

## 2024-11-07 LAB — THINPREP PAP, CYTOLOGY NL11781: NORMAL

## 2024-11-07 PROCEDURE — 3078F DIAST BP <80 MM HG: CPT | Performed by: FAMILY MEDICINE

## 2024-11-07 PROCEDURE — 90472 IMMUNIZATION ADMIN EACH ADD: CPT | Performed by: FAMILY MEDICINE

## 2024-11-07 PROCEDURE — 90651 9VHPV VACCINE 2/3 DOSE IM: CPT | Performed by: FAMILY MEDICINE

## 2024-11-07 PROCEDURE — 90471 IMMUNIZATION ADMIN: CPT | Performed by: FAMILY MEDICINE

## 2024-11-07 PROCEDURE — 97110 THERAPEUTIC EXERCISES: CPT

## 2024-11-07 PROCEDURE — 90677 PCV20 VACCINE IM: CPT | Performed by: FAMILY MEDICINE

## 2024-11-07 PROCEDURE — 99214 OFFICE O/P EST MOD 30 MIN: CPT | Mod: 25 | Performed by: FAMILY MEDICINE

## 2024-11-07 PROCEDURE — 3074F SYST BP LT 130 MM HG: CPT | Performed by: FAMILY MEDICINE

## 2024-11-07 RX ORDER — SYRINGE, DISPOSABLE, 1 ML
SYRINGE, EMPTY DISPOSABLE MISCELLANEOUS
COMMUNITY
Start: 2024-10-30

## 2024-11-07 RX ORDER — ONDANSETRON 4 MG/1
4 TABLET, FILM COATED ORAL EVERY 4 HOURS PRN
Qty: 20 TABLET | Refills: 11 | Status: SHIPPED | OUTPATIENT
Start: 2024-11-07

## 2024-11-07 RX ORDER — TESTOSTERONE CYPIONATE 200 MG/ML
50 INJECTION, SOLUTION INTRAMUSCULAR ONCE
COMMUNITY
Start: 2024-10-30

## 2024-11-07 ASSESSMENT — FIBROSIS 4 INDEX: FIB4 SCORE: 0.27

## 2024-11-07 NOTE — OP THERAPY DAILY TREATMENT
Outpatient Physical Therapy  DAILY TREATMENT     [facility] Physical [address].  [address]  [place] [address]  Phone:  [phone]  Fax:  [phone]    Date: 11/07/2024    Patient: Rick Schafer  YOB: 2000  MRN: [id number]     Time Calculation    Start time: 0950  Stop time: 1030 Time Calculation (min): 40 minutes         Chief Complaint: Knee Problem    Visit #: 4    SUBJECTIVE:  Rick reports he had soreness after last visit but not more than a few hours    OBJECTIVE:      Therapeutic Exercises (CPT 26006):     1. rec bike, 5min warm up, no upright bike-caused ganglion cyst    2. shuttle DL with hip abd, to fatigue, 7c, NT    3. shuttle SL, to fatigue ea, 2 sets, 7c, NT    4. Balance board, 3min    5. Bridge with marching, 8x, difficult maintaining pelvic stability    6. Lat walk purple TB, 6x5 steps ea    7. Monster walks pur TB, 6x5 steps ea    8. Seated quad iso, to fatigue, belt strapped to table    9. Step down with assist, 4in box, to fatigue B      Time-based treatments/modalities:    Physical Therapy Timed Treatment Charges  Therapeutic exercise minutes (CPT 81511): 40 minutes      ASSESSMENT:   Response to treatment: Added double leg hips stability with SL supported activities with good tolerance. No pain reported with quad isometrics. Plan to continue strengthen hip and quad to improve anterior knee pain.    PLAN/RECOMMENDATIONS:   Plan for treatment: therapy treatment to continue next visit.  Planned interventions for next visit: continue with current treatment.

## 2024-11-07 NOTE — PROGRESS NOTES
Verbal consent was acquired by the patient to use [employer] ambient listening note generation during this visit:  Yes      Chief complaint::Diagnoses of Medication side effect, initial encounter, Class 3 severe obesity due to excess calories with serious comorbidity and body mass index (BMI) of 50.0 to 59.9 in adult (HCC), and Immunization due were pertinent to this visit.    Assessment and Plan:   The following treatment plan was discussed:     Assessment & Plan  1. Morbid obesity.  This is a chronic, currently stable condition. He will continue semaglutide 1 mg once a week until symptoms stabilize. A prescription for Zofran 4 mg was sent to her preferred pharmacy to help with nausea associated with the medication side effect. Dietary changes were discussed, focusing on reducing processed foods and increasing fresh fruits, vegetables, and protein intake. He is advised to replace one meal with a protein shake and to eat two meals and a snack daily. Physical exercise, including walking to increase heart rate and lifting weights at least 2-3 times a week, was recommended.    2. Dyslipidemia.  This is a chronic, unstable condition. Dietary modifications were discussed to help improve cholesterol levels, including reducing intake of greasy, fatty, and processed foods. She is advised to freshly prepare foods at home and limit treats to every other week.    3. Testosterone therapy.  She started testosterone therapy on October 30th and is currently in his second week. He reported mood swings and quick anger after the second dose. She will continue therapy at [facility] and monitor her symptoms.    4. Health Maintenance.  She is due for the second round of the HPV vaccine and agreed to receive it today. He is also recommended to get the pneumonia vaccine due to her history of asthma.    Follow-up  Return in 3 months for follow-up.  Rick was seen today for follow-up.    Diagnoses and all orders for this visit:    Medication  "side effect, initial encounter  -     ondansetron (ZOFRAN) 4 MG Tab tablet; Take 1 Tablet by mouth every four hours as needed for Nausea/Vomiting.    Class 3 severe obesity due to excess calories with serious comorbidity and body mass index (BMI) of 50.0 to 59.9 in adult ([facility])    Immunization due  -     9VHPV Vaccine 2-3 Dose (GARDASIL 9)  -     Pneumococcal Conjugate Vaccine 20-Valent (6 wks+)        Followup: Return in about 3 months (around 2/7/2025) for Weight check.    I have placed vaccination orders.  The MA is preforming vaccination orders under the direction of Dr. Jacob    Subjective/HPI:   HPI:    Jessica Schafer is a pleasant 24 y.o. person here for   Chief Complaint   Patient presents with    Follow-Up        History of Present Illness  The patient is a 24-year-old male who is here for follow-up.    He has been experiencing intermittent nausea, which she attributes to he semaglutide medication. She is currently on a 1 mg dose of semaglutide. He has requested a prescription for Zofran to manage her nausea, as he has found it helpful in the past. She is also undergoing testosterone treatment at [facility], which she started on Wednesday, 10/30/2024. She reports feeling well overall, but has noticed some mood swings, including increased irritability. She is considering incorporating protein shakes into her diet, particularly in the mornings.    Current Medicines (including changes today)  Current Outpatient Medications   Medication Sig Dispense Refill    testosterone cypionate (DEPO-TESTOSTERONE) 200 MG/ML injection Inject 50 mg into the shoulder, thigh, or buttocks one time.      BD SYRINGE SLIP TIP 25G X 5/8\" 1 ML Misc USE AS DIRECTED TO INJECT MEDICATION SUBCUTANEOUSLY      ondansetron (ZOFRAN) 4 MG Tab tablet Take 1 Tablet by mouth every four hours as needed for Nausea/Vomiting. 20 Tablet 11    Semaglutide (WEGOVY) 1 MG/0.5ML Solution Auto-injector Pen-injector Inject 0.5 mL under the " "skin every 7 days. 2 mL 0    CIMZIA, 2 SYRINGE, 200 MG/ML Prefilled Syringe Kit       Certolizumab Pegol 2 X 200 MG Kit Inject  under the skin. (Patient not taking: Reported on 10/29/2024)      albuterol 108 (90 Base) MCG/ACT Aero Soln inhalation aerosol albuterol sulfate HFA 90 mcg/actuation aerosol inhaler   Inhale 2 puffs every 4 hours by inhalation route as needed.   Take as needed for shortness of breath and/or wheezing; take before exercise       No current facility-administered medications for this visit.     Past Medical/ Surgical History  He  has a past medical history of Asthma, Mass of left wrist (05/01/2024), and NSVT (nonsustained ventricular tachycardia) (HCC).  He  has a past surgical history that includes pr exc tumor soft tiss forearm&/wrist subfas* (Left, 7/23/2024).       Objective:   /72   Pulse 78   Temp 36.3 °C (97.3 °F)   Ht 1.778 m (5' 10\")   Wt (!) 181 kg (399 lb)   SpO2 98%  Body mass index is 57.25 kg/m².    Physical exam was made by observation   Physical Exam  Constitutional:       General: He is not in acute distress.     Appearance: He is obese.   HENT:      Head: Normocephalic and atraumatic.   Eyes:      Conjunctiva/sclera: Conjunctivae normal.      Pupils: Pupils are equal, round, and reactive to light.   Pulmonary:      Effort: Pulmonary effort is normal. No respiratory distress.   Abdominal:      General: There is no distension.   Musculoskeletal:      Cervical back: Normal range of motion and neck supple.   Skin:     General: Skin is warm and dry.      Findings: No rash.   Neurological:      Mental Status: He is alert and oriented to person, place, and time.      Gait: Gait is intact.   Psychiatric:         Mood and Affect: Affect normal.        Lab/ Imaging Results:  Results  Laboratory Studies  TSH is 1.2. GFR is 113. Total cholesterol is 143. A1c is 4.9%. Blood sugar is 89. Hepatic function panel is normal. CBC is normal. Syphilis test is negative. Hepatitis B " surface antibody is negative. Hepatitis C is negative. HIV is negative.    Please note that this dictation was created using voice recognition software. I have made every reasonable attempt to correct obvious errors, but I expect that there are errors of grammar and possibly content that I did not discover before finalizing the note.

## 2024-11-08 NOTE — TELEPHONE ENCOUNTER
Per insurance, medication is still approved, no further prior auth is needed at this time.     RN contacted [facility] to further inquire about prescription status, per [facility], was informed that the prescription shows it was closed because it was transferred to the [facility] on [address] [place] [address]. RN contacted this location and was informed that the prescription shows closed but they didn't fill the medication for patient due to a system error. [facility] requested office send over a new prescription in order for them to process and fill.     [facility] also informed RN that the medication is now on a long-term backorder and they do not have a release date, they specified that the only dosages they are getting in are 1.7mg and 2.4mg.

## 2024-11-12 ENCOUNTER — PHYSICAL THERAPY (OUTPATIENT)
Dept: PHYSICAL THERAPY | Facility: REHABILITATION | Age: 24
End: 2024-11-12
Attending: PHYSICIAN ASSISTANT
Payer: COMMERCIAL

## 2024-11-12 DIAGNOSIS — M25.562 PAIN IN BOTH KNEES, UNSPECIFIED CHRONICITY: ICD-10-CM

## 2024-11-12 DIAGNOSIS — M25.561 PAIN IN BOTH KNEES, UNSPECIFIED CHRONICITY: ICD-10-CM

## 2024-11-12 PROCEDURE — 97110 THERAPEUTIC EXERCISES: CPT

## 2024-11-12 NOTE — OP THERAPY DAILY TREATMENT
"  Outpatient Physical Therapy  DAILY TREATMENT     [facility] [address].  [address]  [place] [address]  Phone:  [phone]  Fax:  [phone]    Date: 11/12/2024    Patient: Rick Shcafer  YOB: 2000  MRN: [id number]     Time Calculation    Start time: 1455  Stop time: 1535 Time Calculation (min): 40 minutes         Chief Complaint: Knee Problem    Visit #: 5    SUBJECTIVE:  Rick reports he had more pain in the left knee since last visit, feeling a little more unstable \"Maybe because I'm trying to work on 'soft knees' more    OBJECTIVE:      Therapeutic Exercises (CPT 04432):     1. rec bike, 5min warm up, no upright bike-caused ganglion cyst    2. Standing heel raises, to fatigue, NT    3. shuttle SL, emphasis on anterior tibial translation, to fatigue ea, 2 sets, 3c, NT    5. Bridge holds, 2x fatigue    6. Lat walk green TB around feet, 6x5 steps ea, 1 rep only 2/2 to knee pain    7. Monster walks green TB around feet, 6x5 steps ea, NT    8. Seated quad iso, to fatigue 5x ea, belt strapped to table    9. Standing hip abd and ext, 4in box, to fatigue B x2      Time-based treatments/modalities:    Physical Therapy Timed Treatment Charges  Therapeutic exercise minutes (CPT 66666): 40 minutes      ASSESSMENT:   Response to treatment: Will give similar therex progam for a few visits as long as pain isn't more than moderately uncomfortable, then adjust as needed. Plan to continue strengthen hip and quad to improve anterior knee pain.    PLAN/RECOMMENDATIONS:   Plan for treatment: therapy treatment to continue next visit.  Planned interventions for next visit: continue with current treatment.         "

## 2024-11-14 ENCOUNTER — APPOINTMENT (OUTPATIENT)
Dept: PHYSICAL THERAPY | Facility: REHABILITATION | Age: 24
End: 2024-11-14
Attending: PHYSICIAN ASSISTANT
Payer: COMMERCIAL

## 2024-11-14 DIAGNOSIS — M25.561 PAIN IN BOTH KNEES, UNSPECIFIED CHRONICITY: ICD-10-CM

## 2024-11-14 DIAGNOSIS — M25.562 PAIN IN BOTH KNEES, UNSPECIFIED CHRONICITY: ICD-10-CM

## 2024-11-14 PROCEDURE — 97110 THERAPEUTIC EXERCISES: CPT

## 2024-11-14 NOTE — OP THERAPY DAILY TREATMENT
Outpatient Physical Therapy  DAILY TREATMENT     [address].  [address]  [place] [address]  Phone:  [phone]  Fax:  [phone]    Date: 11/14/2024    Patient: Rick Schafer  YOB: 2000  MRN: [id number]     Time Calculation    Start time: 1445  Stop time: 1530 Time Calculation (min): 45 minutes         Chief Complaint: Knee Problem    Visit #: 6    SUBJECTIVE:  Right knee flared up the night of last visit, then took a CBD pill for the first time with some relief the next day. Also didn't notice pain during stairs yesterday.     OBJECTIVE:      Therapeutic Exercises (CPT 31856):     1. rec bike, 5min warm up, no upright bike-caused ganglion cyst    2. Standing heel raises, to fatigue, NT    3. shuttle SL, emphasis on anterior tibial translation, to fatigue ea, 2 sets, 3c, NT    4. Tandem balance on foam, 3x20s    5. Bridge holds, 2x fatigue, NT    6. Lat walk green TB around feet, 6x5 steps ea, NT    7. Monster walks green TB around feet, 6x5 steps ea, NT    8. Seated quad iso, to fatigue 5x ea, belt strapped to table    9. Standing hip abd and ext, 4in box, to fatigue B x2    10. SLB, toe touch assist as needed, tossing plyo ball, 5min    11. Prone hamstring curl, 7.5#, to fatigue ea      Time-based treatments/modalities:    Physical Therapy Timed Treatment Charges  Therapeutic exercise minutes (CPT 55142): 45 minutes      ASSESSMENT:   Response to treatment: Will give similar therex progam 1-2 visits as long as pain isn't more than moderately uncomfortable, then adjust as needed. PT reporting improved stair tolerance since last visit but no change to regular flare ups. Plan to continue strengthen hip and quad to improve anterior knee pain.    PLAN/RECOMMENDATIONS:   Plan for treatment: therapy treatment to continue next visit.  Planned interventions for next visit: continue with current treatment.

## 2024-11-19 ENCOUNTER — PHYSICAL THERAPY (OUTPATIENT)
Dept: PHYSICAL THERAPY | Facility: REHABILITATION | Age: 24
End: 2024-11-19
Attending: PHYSICIAN ASSISTANT
Payer: COMMERCIAL

## 2024-11-19 DIAGNOSIS — M25.561 PAIN IN BOTH KNEES, UNSPECIFIED CHRONICITY: ICD-10-CM

## 2024-11-19 DIAGNOSIS — M25.562 PAIN IN BOTH KNEES, UNSPECIFIED CHRONICITY: ICD-10-CM

## 2024-11-19 PROCEDURE — 97110 THERAPEUTIC EXERCISES: CPT

## 2024-11-19 NOTE — OP THERAPY DAILY TREATMENT
Outpatient Physical Therapy  DAILY TREATMENT     [facility] [address].  [address]  [place] [address]  Phone:  [phone]  Fax:  [phone]    Date: 11/19/2024    Patient: Rick Schafer  YOB: 2000  MRN: [id number]     Time Calculation    Start time: 1450  Stop time: 1530 Time Calculation (min): 40 minutes         Chief Complaint: Knee Problem    Visit #: 7    SUBJECTIVE:  Continues to take the CBD gummies, had one flare up last night which wasn't as bad as usual, stairs are about the same or maybe a little easier    OBJECTIVE:      Therapeutic Exercises (CPT 82127):     1. rec bike, 5min warm up, no upright bike-caused ganglion cyst    2. Standing heel raises, to fatigue, NT    3. shuttle SL, emphasis on anterior tibial translation, to fatigue ea, 2 sets, 3c    4. Tandem balance on foam, 3x20s, NT    5. Bridge holds, 2x fatigue, NT    6. Lat walk green TB around feet, 6x5 steps ea, NT    7. Monster walks mauveTB around calves, 6x5 steps ea    8. Seated quad iso, to fatigue 5x ea, belt strapped to table    9. Standing hip abd and ext, 4in box, to fatigue B x2    10. SLB, toe touch assist as needed, tossing plyo ball, 3x30s ea, no plyo ball today    11. Prone hamstring curl, 7.5#, to fatigue ea, NT      Time-based treatments/modalities:    Physical Therapy Timed Treatment Charges  Therapeutic exercise minutes (CPT 37267): 40 minutes      ASSESSMENT:   Response to treatment: Will give similar therex progam 1-2 visits as long as pain isn't more than moderately uncomfortable, then adjust as needed. Pt demo's tolerable increased reps for isometrics and shuttle with emphasis on anterior tibial translation to simulate stairs - progress resistance next visit. Plan to continue strengthen hip and quad to improve anterior knee pain.    PLAN/RECOMMENDATIONS:   Plan for treatment: therapy treatment to continue next visit.  Planned interventions for next visit: continue  with current treatment.

## 2024-11-20 NOTE — PATIENT COMMUNICATION
Received request via: Patient    Was the patient seen in the last year in this department? Yes    Does the patient have an active prescription (recently filled or refills available) for medication(s) requested? No    [facility]     Does the patient have [facility] and need 100-day supply? (This applies to ALL medications) Patient does not have SCP

## 2024-11-21 ENCOUNTER — PHYSICAL THERAPY (OUTPATIENT)
Dept: PHYSICAL THERAPY | Facility: REHABILITATION | Age: 24
End: 2024-11-21
Attending: PHYSICIAN ASSISTANT
Payer: COMMERCIAL

## 2024-11-21 DIAGNOSIS — M25.562 PAIN IN BOTH KNEES, UNSPECIFIED CHRONICITY: ICD-10-CM

## 2024-11-21 DIAGNOSIS — M25.561 PAIN IN BOTH KNEES, UNSPECIFIED CHRONICITY: ICD-10-CM

## 2024-11-21 PROCEDURE — 97110 THERAPEUTIC EXERCISES: CPT

## 2024-11-21 NOTE — OP THERAPY PROGRESS SUMMARY
"  Outpatient Physical Therapy  PROGRESS SUMMARY NOTE      [facility] [address].  [address]  [place] [address]  Phone:  [phone]  Fax:  [phone]    Date of Visit: 11/21/2024    Patient: Rick Schafer  YOB: 2000  MRN: [id number]     Referring Provider: Mic Gillespie P.A.-C.  [address] [place] [address]   Referring Diagnosis Pain in left knee [M25.562];Patellofemoral disorders, left knee [M22.2X2]     Visit Diagnoses     ICD-10-CM   1. Pain in both knees, unspecified chronicity  M25.561    M25.562       Rehab Potential: good    Progress Report Period: 10/22/24-11/21/24        Objective Findings and Assessment:   Patient progression towards goals: Pt demo overall functional improvements. Demo improved bridge hold indicating greater glute max strength and better tolerated stair participation. Impairments continue to include anterior knee pain with stairs although decreased and pain and flare ups of knee pain at night although less in frequency. The patient will benefit from continued skilled therapy with focus on strength and ROM deficits in order to increase participation with daily tasks. The pt states he understands and agrees to the POC.      Short Term Goals:   Pt. To be compliant w/ a HEP of flexibility exercises by prescribing PT - met  Initiate eccentric control exercises emphasis on not locking knee - met      Long Term Goals:    Bridge hold ~2' (11/21/24 1:20) - progressing  6\" step L up, R down w/out pain and with correct knee mechanics - progressing  Pt. To follow a hep/self care techniques to control knee pain - progressing      Objective findings and assessment details: Bridge hold 1:20  Romeo test: still positive L>R  Step down test x5 ea side: 2/10 pain in R knee, 5/10 pain in L knee    Goals:   Short Term Goals:   Romeo test negative  Step down test with 2/10 pain or less B  Short term goal time span:  2-4 weeks    " "  Long Term Goals:    Bridge hold ~2' (11/21/24 1:20)   6\" step L up, R down w/out pain and with correct knee mechanics   Pt. To follow a hep/self care techniques to control knee pain   Long term goal time span:  6-8 weeks    Plan:   Planned therapy interventions:  E Stim Unattended (CPT 31033), Manual Therapy (CPT 55072) and Therapeutic Exercise (CPT 28694)  Frequency:  2x week  Duration in weeks:  8  Plan details:  Progress hip and quad strengthening for anterior knee pain      Referring provider co-signature:  I have reviewed this plan of care and my co-signature certifies the need for services.     Certification Period: 11/21/2024 to 01/30/25    Physician Signature: ________________________________ Date: ______________          "

## 2024-11-21 NOTE — OP THERAPY DAILY TREATMENT
Outpatient Physical Therapy  DAILY TREATMENT     [address].  [address]  [place] [address]  Phone:  [phone]  Fax:  [phone]    Date: 11/21/2024    Patient: Rick Schafer  YOB: 2000  MRN: [id number]     Time Calculation    Start time: 1450  Stop time: 1535 Time Calculation (min): 45 minutes         Chief Complaint: Knee Problem    Visit #: 8    SUBJECTIVE:  Continues to take the CBD gummies, had one flare up last night which wasn't as bad as usual, stairs are about the same or maybe a little easier    OBJECTIVE: see progress note      Therapeutic Exercises (CPT 97434):     1. rec bike, 5min warm up, no upright bike-caused ganglion cyst    2. Standing heel raises, to fatigue, NT    3. shuttle SL, emphasis on anterior tibial translation, to fatigue ea, 2 sets, 3c, NT    4. Tandem balance on foam, 3x20s, NT    5. Bridge holds, 2x fatigue, NT    6. Lat walk green TB around feet, 6x5 steps ea, NT    7. Monster walks mauveTB around calves, 6x5 steps ea, NT    8. Seated quad iso, to fatigue 5x ea, belt strapped to table    9. Standing hip abd and ext, 4in box, to fatigue B x2, NT    10. SLB, toe touch assist as needed, tossing plyo ball, 3x30s ea, NT    11. Prone hamstring curl, 7.5#, to fatigue ea, NT    12. Step up with opp knee drive, 4in step 2x8 ea, inc pain in low back    13. RDL, 2x10, inc pain in low back    14. Standing hip flexor stretch, 30s ea      Time-based treatments/modalities:    Physical Therapy Timed Treatment Charges  Therapeutic exercise minutes (CPT 10639): 45 minutes      ASSESSMENT:   Response to treatment: see progress note    PLAN/RECOMMENDATIONS:   Plan for treatment: therapy treatment to continue next visit.  Planned interventions for next visit: continue with current treatment.

## 2024-11-21 NOTE — TELEPHONE ENCOUNTER
Please reach out to the patient and see if he is ready for a dose increase or if he wants to stay at the dose he is currently at.    Thanks,  Priya

## 2024-11-26 ENCOUNTER — APPOINTMENT (OUTPATIENT)
Dept: PHYSICAL THERAPY | Facility: REHABILITATION | Age: 24
End: 2024-11-26
Attending: PHYSICIAN ASSISTANT
Payer: COMMERCIAL

## 2024-12-03 ENCOUNTER — PHYSICAL THERAPY (OUTPATIENT)
Dept: PHYSICAL THERAPY | Facility: REHABILITATION | Age: 24
End: 2024-12-03
Attending: PHYSICIAN ASSISTANT
Payer: COMMERCIAL

## 2024-12-03 ENCOUNTER — PATIENT MESSAGE (OUTPATIENT)
Dept: MEDICAL GROUP | Facility: MEDICAL CENTER | Age: 24
End: 2024-12-03
Payer: COMMERCIAL

## 2024-12-03 DIAGNOSIS — M54.42 CHRONIC BILATERAL LOW BACK PAIN WITH LEFT-SIDED SCIATICA: ICD-10-CM

## 2024-12-03 DIAGNOSIS — G89.29 CHRONIC BILATERAL LOW BACK PAIN WITH LEFT-SIDED SCIATICA: ICD-10-CM

## 2024-12-03 DIAGNOSIS — M22.2X2 PATELLOFEMORAL PAIN SYNDROME OF LEFT KNEE: ICD-10-CM

## 2024-12-03 DIAGNOSIS — E66.813 CLASS 3 SEVERE OBESITY DUE TO EXCESS CALORIES WITH SERIOUS COMORBIDITY AND BODY MASS INDEX (BMI) OF 50.0 TO 59.9 IN ADULT (HCC): ICD-10-CM

## 2024-12-03 DIAGNOSIS — M25.561 PAIN IN BOTH KNEES, UNSPECIFIED CHRONICITY: ICD-10-CM

## 2024-12-03 DIAGNOSIS — M25.562 PAIN IN BOTH KNEES, UNSPECIFIED CHRONICITY: ICD-10-CM

## 2024-12-03 DIAGNOSIS — E66.01 CLASS 3 SEVERE OBESITY DUE TO EXCESS CALORIES WITH SERIOUS COMORBIDITY AND BODY MASS INDEX (BMI) OF 50.0 TO 59.9 IN ADULT (HCC): ICD-10-CM

## 2024-12-03 PROCEDURE — 97110 THERAPEUTIC EXERCISES: CPT

## 2024-12-03 NOTE — OP THERAPY DAILY TREATMENT
Outpatient Physical Therapy  DAILY TREATMENT     [address]  [address]  [place] [address]  Phone:  [phone]  Fax:  [phone]    Date: 12/03/2024    Patient: Rick Schafer  YOB: 2000  MRN: [id number]     Time Calculation    Start time: 1450  Stop time: 1535 Time Calculation (min): 45 minutes         Chief Complaint: Back Problem and Knee Problem    Visit #: 9    SUBJECTIVE:  Reports that he threw his back out last week which is most forefront on his mind so the knee pain is not as noticeable. Stairs is still painful but noticeably easier    OBJECTIVE:       Therapeutic Exercises (CPT 25285):     1. rec bike, 5min warm up, no upright bike-caused ganglion cyst    2. Standing heel raises, to fatigue, NT    3. shuttle SL, emphasis on anterior tibial translation, to fatigue ea, 2 sets, 3c, NT    4. Tandem balance on foam, 3x20s, NT    5. Bridge holds, 2x fatigue, NT    6. Lat walk green TB around feet, 6x5 steps ea, NT    7. Monster walks mauveTB around calves, 6x5 steps ea, NT    8. Seated quad iso, to fatigue 5x ea, belt strapped to table    9. Standing hip abd and ext, 4in box, to fatigue B x2, NT    10. SLB, toe touch assist as needed, tossing plyo ball, 3x30s ea, NT    11. Prone hamstring curl, 7.5#, to fatigue ea, NT    12. Step up with opp knee drive, 4in step 2x8 ea, NT    13. RDL, 2x10, NT    14. Standing hip flexor stretch, 30s ea    15. Dead hang, 2x30s    16. Supine knee flex and LTR on ball, 2min ea      Time-based treatments/modalities:    Physical Therapy Timed Treatment Charges  Therapeutic exercise minutes (CPT 85536): 45 minutes      ASSESSMENT:   Response to treatment: decreased standing activity tolerance due to acute exacerbation of LBP, focussed today on seated and mat level LE strengthening and mobility to adjust individualized treatment    PLAN/RECOMMENDATIONS:   Plan for treatment: therapy treatment to continue next  visit.  Planned interventions for next visit: continue with current treatment.

## 2024-12-05 ENCOUNTER — APPOINTMENT (OUTPATIENT)
Dept: RADIOLOGY | Facility: MEDICAL CENTER | Age: 24
End: 2024-12-05
Attending: FAMILY MEDICINE
Payer: COMMERCIAL

## 2024-12-05 ENCOUNTER — PHYSICAL THERAPY (OUTPATIENT)
Dept: PHYSICAL THERAPY | Facility: REHABILITATION | Age: 24
End: 2024-12-05
Attending: PHYSICIAN ASSISTANT
Payer: COMMERCIAL

## 2024-12-05 DIAGNOSIS — E66.813 CLASS 3 SEVERE OBESITY DUE TO EXCESS CALORIES WITH SERIOUS COMORBIDITY AND BODY MASS INDEX (BMI) OF 50.0 TO 59.9 IN ADULT (HCC): ICD-10-CM

## 2024-12-05 DIAGNOSIS — M54.42 CHRONIC BILATERAL LOW BACK PAIN WITH LEFT-SIDED SCIATICA: ICD-10-CM

## 2024-12-05 DIAGNOSIS — M25.561 PAIN IN BOTH KNEES, UNSPECIFIED CHRONICITY: ICD-10-CM

## 2024-12-05 DIAGNOSIS — G89.29 CHRONIC BILATERAL LOW BACK PAIN WITH LEFT-SIDED SCIATICA: ICD-10-CM

## 2024-12-05 DIAGNOSIS — E66.01 CLASS 3 SEVERE OBESITY DUE TO EXCESS CALORIES WITH SERIOUS COMORBIDITY AND BODY MASS INDEX (BMI) OF 50.0 TO 59.9 IN ADULT (HCC): ICD-10-CM

## 2024-12-05 DIAGNOSIS — M22.2X2 PATELLOFEMORAL PAIN SYNDROME OF LEFT KNEE: ICD-10-CM

## 2024-12-05 DIAGNOSIS — M25.562 PAIN IN BOTH KNEES, UNSPECIFIED CHRONICITY: ICD-10-CM

## 2024-12-05 PROCEDURE — 72100 X-RAY EXAM L-S SPINE 2/3 VWS: CPT

## 2024-12-05 PROCEDURE — 97110 THERAPEUTIC EXERCISES: CPT

## 2024-12-05 NOTE — OP THERAPY DAILY TREATMENT
Outpatient Physical Therapy  DAILY TREATMENT     [address]  [address]  [place] [address]  Phone:  [phone]  Fax:  [phone]    Date: 12/05/2024    Patient: Rick Schafer  YOB: 2000  MRN: [id number]     Time Calculation    Start time: 1315  Stop time: 1400 Time Calculation (min): 45 minutes         Chief Complaint: Knee Problem    Visit #: 10    SUBJECTIVE:  Back pain is about the same, had xrays this morning.     OBJECTIVE:       Therapeutic Exercises (CPT 58450):     1. rec bike, 5min warm up, no upright bike-caused ganglion cyst    2. Standing heel raises, to fatigue, NT    3. shuttle SL, emphasis on anterior tibial translation, to fatigue ea, 2 sets, 3c, NT    4. Tandem balance on foam, 3x20s, NT    5. Bridge holds, 2x fatigue, NT    6. Lat walk green TB around feet, 6x5 steps ea, NT    7. Monster walks mauveTB around calves, 6x5 steps ea, NT    8. Seated quad iso, to fatigue 5x ea, belt strapped to table    9. Standing hip abd and ext, 4in box, to fatigue B x2, NT    10. SLB, toe touch assist as needed, tossing plyo ball, 3x30s ea, foam    11. Prone hamstring curl, 7.5#, to fatigue ea, NT    12. Step up with opp knee drive, 4in step 2x8 ea, NT    13. Clamshell piTB, 2x fatigue B    14. SLR, 2xfatigue B    20. PN 12/20      Time-based treatments/modalities:    Physical Therapy Timed Treatment Charges  Therapeutic exercise minutes (CPT 90660): 45 minutes      ASSESSMENT:   Response to treatment: quad and glute strengthening performed on mat level due to continued exacerbation of LBP, tolerated 1 standing SL stance activity without increase of knee or back pain    PLAN/RECOMMENDATIONS:   Plan for treatment: therapy treatment to continue next visit.  Planned interventions for next visit: continue with current treatment.

## 2024-12-10 ENCOUNTER — APPOINTMENT (OUTPATIENT)
Dept: PHYSICAL THERAPY | Facility: REHABILITATION | Age: 24
End: 2024-12-10
Attending: PHYSICIAN ASSISTANT
Payer: COMMERCIAL

## 2024-12-12 ENCOUNTER — APPOINTMENT (OUTPATIENT)
Dept: PHYSICAL THERAPY | Facility: REHABILITATION | Age: 24
End: 2024-12-12
Attending: PHYSICIAN ASSISTANT
Payer: COMMERCIAL

## 2024-12-13 RX ORDER — SEMAGLUTIDE 1 MG/.5ML
1 INJECTION, SOLUTION SUBCUTANEOUS
Qty: 2 ML | Refills: 0 | Status: SHIPPED | OUTPATIENT
Start: 2024-12-13 | End: 2024-12-19

## 2024-12-17 ENCOUNTER — APPOINTMENT (OUTPATIENT)
Dept: PHYSICAL THERAPY | Facility: REHABILITATION | Age: 24
End: 2024-12-17
Attending: PHYSICIAN ASSISTANT
Payer: COMMERCIAL

## 2024-12-17 DIAGNOSIS — M22.2X2 PATELLOFEMORAL PAIN SYNDROME OF LEFT KNEE: ICD-10-CM

## 2024-12-17 DIAGNOSIS — M25.561 PAIN IN BOTH KNEES, UNSPECIFIED CHRONICITY: ICD-10-CM

## 2024-12-17 DIAGNOSIS — M25.562 PAIN IN BOTH KNEES, UNSPECIFIED CHRONICITY: ICD-10-CM

## 2024-12-17 PROCEDURE — 97110 THERAPEUTIC EXERCISES: CPT

## 2024-12-19 ENCOUNTER — OFFICE VISIT (OUTPATIENT)
Dept: MEDICAL GROUP | Facility: MEDICAL CENTER | Age: 24
End: 2024-12-19
Payer: COMMERCIAL

## 2024-12-19 ENCOUNTER — PHYSICAL THERAPY (OUTPATIENT)
Dept: PHYSICAL THERAPY | Facility: REHABILITATION | Age: 24
End: 2024-12-19
Attending: PHYSICIAN ASSISTANT
Payer: COMMERCIAL

## 2024-12-19 VITALS
DIASTOLIC BLOOD PRESSURE: 78 MMHG | TEMPERATURE: 97.3 F | BODY MASS INDEX: 41.95 KG/M2 | HEART RATE: 73 BPM | HEIGHT: 70 IN | SYSTOLIC BLOOD PRESSURE: 130 MMHG | OXYGEN SATURATION: 98 % | WEIGHT: 293 LBS

## 2024-12-19 DIAGNOSIS — M22.2X2 PATELLOFEMORAL PAIN SYNDROME OF LEFT KNEE: ICD-10-CM

## 2024-12-19 DIAGNOSIS — G89.29 CHRONIC MIDLINE LOW BACK PAIN WITHOUT SCIATICA: ICD-10-CM

## 2024-12-19 DIAGNOSIS — Z91.89 AT RISK FOR OBSTRUCTIVE SLEEP APNEA: ICD-10-CM

## 2024-12-19 DIAGNOSIS — M54.50 CHRONIC MIDLINE LOW BACK PAIN WITHOUT SCIATICA: ICD-10-CM

## 2024-12-19 DIAGNOSIS — M25.561 PAIN IN BOTH KNEES, UNSPECIFIED CHRONICITY: ICD-10-CM

## 2024-12-19 DIAGNOSIS — E66.813 CLASS 3 SEVERE OBESITY DUE TO EXCESS CALORIES WITH SERIOUS COMORBIDITY AND BODY MASS INDEX (BMI) OF 50.0 TO 59.9 IN ADULT (HCC): ICD-10-CM

## 2024-12-19 DIAGNOSIS — E28.2 POLYCYSTIC OVARIAN SYNDROME: ICD-10-CM

## 2024-12-19 DIAGNOSIS — E66.01 CLASS 3 SEVERE OBESITY DUE TO EXCESS CALORIES WITH SERIOUS COMORBIDITY AND BODY MASS INDEX (BMI) OF 50.0 TO 59.9 IN ADULT (HCC): ICD-10-CM

## 2024-12-19 DIAGNOSIS — M25.562 PAIN IN BOTH KNEES, UNSPECIFIED CHRONICITY: ICD-10-CM

## 2024-12-19 PROCEDURE — 97530 THERAPEUTIC ACTIVITIES: CPT

## 2024-12-19 PROCEDURE — 97110 THERAPEUTIC EXERCISES: CPT

## 2024-12-19 PROCEDURE — 99214 OFFICE O/P EST MOD 30 MIN: CPT | Performed by: FAMILY MEDICINE

## 2024-12-19 PROCEDURE — 3078F DIAST BP <80 MM HG: CPT | Performed by: FAMILY MEDICINE

## 2024-12-19 PROCEDURE — 3075F SYST BP GE 130 - 139MM HG: CPT | Performed by: FAMILY MEDICINE

## 2024-12-19 RX ORDER — MELOXICAM 15 MG/1
15 TABLET ORAL DAILY
Qty: 90 TABLET | Refills: 3 | Status: SHIPPED | OUTPATIENT
Start: 2024-12-19

## 2024-12-19 RX ORDER — SEMAGLUTIDE 0.5 MG/.5ML
0.5 INJECTION, SOLUTION SUBCUTANEOUS
Qty: 2 ML | Refills: 1 | Status: SHIPPED | OUTPATIENT
Start: 2024-12-19

## 2024-12-19 RX ORDER — CYCLOBENZAPRINE HCL 5 MG
5-10 TABLET ORAL 3 TIMES DAILY PRN
Qty: 90 TABLET | Refills: 3 | Status: SHIPPED | OUTPATIENT
Start: 2024-12-19

## 2024-12-19 ASSESSMENT — FIBROSIS 4 INDEX: FIB4 SCORE: 0.27

## 2024-12-19 NOTE — OP THERAPY DISCHARGE SUMMARY
"  Outpatient Physical Therapy  DISCHARGE SUMMARY NOTE      [facility] [address].  [address]  [place] [address]  Phone:  [phone]  Fax:  [phone]    Date of Visit: 12/19/2024    Patient: Rick Schafer  YOB: 2000  MRN: [id number]     Referring Provider: Mic Gillespie P.A.-C.  [address] [place] [address]   Referring Diagnosis Pain in left knee [M25.562];Patellofemoral disorders, left knee [M22.2X2]         Functional Assessment Used  WOMAC Grand Total: 12.5 (59.38 at eval)    Your patient is being discharged from Physical Therapy with the following comments:   Progress plateau    Comments:  Short Term Goals:   Romeo test negative - MET  Step down test with 2/10 pain or less B - MET with 1/10 pain B  Short term goal time span:  2-4 weeks      Long Term Goals:    Bridge hold ~2' (11/21/24 1:20) (12/19/24 2:00) MET  6\" step L up, R down w/out pain and with correct knee mechanics - MET  Pt. To follow a hep/self care techniques to control knee pain - MET  Long term goal time span:  6-8 weeks     Limitations Remaining:  None    Recommendations:  Pt is appropriate for discharge from PT at this time due to functional goals met and anticipated continued progress with adherence to their HEP. Pt is instructed to contact PT as needed with any questions or concerns.       Mary Beth Shabazz, PT    Date: 12/19/2024         "

## 2024-12-19 NOTE — PROGRESS NOTES
Verbal consent was acquired by the patient to use [employer] ambient listening note generation during this visit:  Yes      Chief complaint::Diagnoses of Class 3 severe obesity due to excess calories with serious comorbidity and body mass index (BMI) of 50.0 to 59.9 in adult ([facility]), Chronic midline low back pain without sciatica, Polycystic ovarian syndrome, and At risk for obstructive sleep apnea were pertinent to this visit.    Assessment and Plan:   The following treatment plan was discussed:     Assessment & Plan  1. Chronic thoracic back pain - Chronic condition with numbness and tingling in the legs, no shooting pain. Likely due to muscle overloading and spasms.  - Ordered MRI  - Referred to physiatry  - Prescribed Flexeril 5-10 mg up to three times daily as needed  - Encouraged gentle stretches and exercises  - Recommended weight reduction    2. Obesity - Chronic condition, unstable  - Advised to improve diet, increase protein intake, reduce calories, and consider intermittent fasting  - Referred to nutrition services  - Prescribed Wegovy 0.5 mg once weekly    Follow-up as needed.  Rick was seen today for results.    Diagnoses and all orders for this visit:    Class 3 severe obesity due to excess calories with serious comorbidity and body mass index (BMI) of 50.0 to 59.9 in adult ([facility])  -     MR-LUMBAR SPINE-W/O; Future  -     Referral to Pain Management  -     Referral to Nutrition Services  -     Semaglutide (WEGOVY) 0.5 MG/0.5ML Solution Auto-injector Pen-injector; Inject 0.5 mL under the skin every 7 days.    Chronic midline low back pain without sciatica  -     MR-LUMBAR SPINE-W/O; Future  -     Referral to Pain Management  -     Referral to Nutrition Services  -     cyclobenzaprine (FLEXERIL) 5 mg tablet; Take 1-2 Tablets by mouth 3 times a day as needed for Muscle Spasms or Moderate Pain.  -     meloxicam (MOBIC) 15 MG tablet; Take 1 Tablet by mouth every day.  -     Semaglutide (WEGOVY) 0.5 MG/0.5ML  Solution Auto-injector Pen-injector; Inject 0.5 mL under the skin every 7 days.    Polycystic ovarian syndrome  -     Semaglutide (WEGOVY) 0.5 MG/0.5ML Solution Auto-injector Pen-injector; Inject 0.5 mL under the skin every 7 days.    At risk for obstructive sleep apnea  -     Semaglutide (WEGOVY) 0.5 MG/0.5ML Solution Auto-injector Pen-injector; Inject 0.5 mL under the skin every 7 days.        Followup: Return if symptoms worsen or fail to improve.    Subjective/HPI:     HPI:    Jesscia Schafer is a pleasant 24 y.o. person here for   Chief Complaint   Patient presents with    Results        History of Present Illness  The patient is a 24-year-old presenting with chronic back pain.    They report numbness and pressure in the mid-lower back, extending to the upper buttock region, with chronic numbness and tingling in their legs that do not worsen upon standing. They have not consulted a physiatrist but are receiving knee treatment. Their mobility is limited, with difficulty walking and sitting for long periods. Approximately 3 weeks ago, they experienced discomfort after installing snow tire chains, which has persisted. They have not tried Flexeril or other muscle relaxants.    They are trying to improve their diet and reduce food intake, avoiding eating out. Despite this, they report rapid weight gain, possibly due to testosterone therapy. They have been unable to obtain Wegovy and have not consulted a nutritionist. They plan to increase protein intake through shakes and consume more fruits. They follow intermittent fasting, eating lunch and dinner with occasional morning snacks. They have not been on Wegovy for several months and are open to other brands.    MEDICATIONS  Current: testosterone    Current Medicines (including changes today)  Current Outpatient Medications   Medication Sig Dispense Refill    cyclobenzaprine (FLEXERIL) 5 mg tablet Take 1-2 Tablets by mouth 3 times a day as needed for Muscle  "Spasms or Moderate Pain. 90 Tablet 3    meloxicam (MOBIC) 15 MG tablet Take 1 Tablet by mouth every day. 90 Tablet 3    Semaglutide (WEGOVY) 0.5 MG/0.5ML Solution Auto-injector Pen-injector Inject 0.5 mL under the skin every 7 days. 2 mL 1    testosterone cypionate (DEPO-TESTOSTERONE) 200 MG/ML injection Inject 50 mg into the shoulder, thigh, or buttocks one time.      BD SYRINGE SLIP TIP 25G X 5/8\" 1 ML Misc USE AS DIRECTED TO INJECT MEDICATION SUBCUTANEOUSLY      ondansetron (ZOFRAN) 4 MG Tab tablet Take 1 Tablet by mouth every four hours as needed for Nausea/Vomiting. 20 Tablet 11    CIMZIA, 2 SYRINGE, 200 MG/ML Prefilled Syringe Kit       albuterol 108 (90 Base) MCG/ACT Aero Soln inhalation aerosol albuterol sulfate HFA 90 mcg/actuation aerosol inhaler   Inhale 2 puffs every 4 hours by inhalation route as needed.   Take as needed for shortness of breath and/or wheezing; take before exercise       No current facility-administered medications for this visit.     Past Medical/ Surgical History  He  has a past medical history of Asthma, Mass of left wrist (05/01/2024), and NSVT (nonsustained ventricular tachycardia) ([facility]).  He  has a past surgical history that includes pr exc tumor soft tiss forearm&/wrist subfas* (Left, 7/23/2024).       Objective:   /78   Pulse 73   Temp 36.3 °C (97.3 °F)   Ht 1.778 m (5' 10\")   Wt (!) 186 kg (410 lb)   SpO2 98%  Body mass index is 58.83 kg/m².    Physical exam was made by observation   Physical Exam  Constitutional:       General: He is not in acute distress.     Appearance: He is obese.   HENT:      Head: Normocephalic and atraumatic.   Eyes:      Conjunctiva/sclera: Conjunctivae normal.      Pupils: Pupils are equal, round, and reactive to light.   Pulmonary:      Effort: Pulmonary effort is normal. No respiratory distress.   Abdominal:      General: There is no distension.   Musculoskeletal:      Cervical back: Normal range of motion and neck supple.   Skin:     " General: Skin is warm and dry.      Findings: No rash.   Neurological:      Mental Status: He is alert and oriented to person, place, and time.      Gait: Gait is intact.   Psychiatric:         Mood and Affect: Affect normal.          Lab/ Imaging Results:  No labs or imaging to review    Please note that this dictation was created using voice recognition software. I have made every reasonable attempt to correct obvious errors, but I expect that there are errors of grammar and possibly content that I did not discover before finalizing the note.

## 2024-12-19 NOTE — OP THERAPY DAILY TREATMENT
Outpatient Physical Therapy  DAILY TREATMENT     [address]  [address]  [place] [address]  Phone:  [phone]  Fax:  [phone]    Date: 12/19/2024    Patient: Rick Schafer  YOB: 2000  MRN: [id number]     Time Calculation    Start time: 1455  Stop time: 1535 Time Calculation (min): 40 minutes         Chief Complaint: Knee Problem    Visit #: 12    SUBJECTIVE:  Overall has had a good experience with PT for his knees and feels like it genuinely helped. Feels confident to continue independently with HEP and may return to PT for back pain.    OBJECTIVE:       Therapeutic Exercises (CPT 24029):     2. Standing heel raises, to fatigue, review for HEP    5. Bridge holds, 2:00 min    6. Lat walk green TB around feet, 6x5 steps ea, review for HEP    7. Monster walks mauveTB around calves, 6x5 steps ea, review for HEP    8. Seated quad iso, to fatigue 5x ea, review for HEP    10. SLB, toe touch assist as needed, tossing plyo ball, 3x30s ea, foam, review for HEP    12. Step up with opp knee drive, 4in step 2x8 ea, review for HEP    Therapeutic Treatments and Modalities:     1. Therapeutic Activities (CPT 45675), retest objective measures, review results of WOMAC, discuss discharge plan    Time-based treatments/modalities:    Physical Therapy Timed Treatment Charges  Therapeutic activity minutes (CPT 00688): 30 minutes  Therapeutic exercise minutes (CPT 30178): 15 minutes      ASSESSMENT:   Response to treatment: see discharge summary    PLAN/RECOMMENDATIONS:   Plan for treatment: discharge patient due to accomplished goals.

## 2025-01-03 ENCOUNTER — APPOINTMENT (OUTPATIENT)
Dept: RADIOLOGY | Facility: MEDICAL CENTER | Age: 25
End: 2025-01-03
Attending: FAMILY MEDICINE
Payer: COMMERCIAL

## 2025-01-08 ENCOUNTER — APPOINTMENT (OUTPATIENT)
Dept: PHYSICAL MEDICINE AND REHAB | Facility: MEDICAL CENTER | Age: 25
End: 2025-01-08
Payer: COMMERCIAL

## 2025-01-08 VITALS
TEMPERATURE: 97.4 F | OXYGEN SATURATION: 96 % | BODY MASS INDEX: 41.95 KG/M2 | HEIGHT: 70 IN | DIASTOLIC BLOOD PRESSURE: 88 MMHG | SYSTOLIC BLOOD PRESSURE: 130 MMHG | HEART RATE: 100 BPM | WEIGHT: 293 LBS

## 2025-01-08 DIAGNOSIS — E66.01 SEVERE OBESITY (BMI >= 40) (HCC): ICD-10-CM

## 2025-01-08 DIAGNOSIS — G89.29 CHRONIC BILATERAL LOW BACK PAIN WITHOUT SCIATICA: ICD-10-CM

## 2025-01-08 DIAGNOSIS — M54.50 CHRONIC BILATERAL LOW BACK PAIN WITHOUT SCIATICA: ICD-10-CM

## 2025-01-08 PROCEDURE — 99204 OFFICE O/P NEW MOD 45 MIN: CPT | Performed by: STUDENT IN AN ORGANIZED HEALTH CARE EDUCATION/TRAINING PROGRAM

## 2025-01-08 PROCEDURE — 3079F DIAST BP 80-89 MM HG: CPT | Performed by: STUDENT IN AN ORGANIZED HEALTH CARE EDUCATION/TRAINING PROGRAM

## 2025-01-08 PROCEDURE — 3075F SYST BP GE 130 - 139MM HG: CPT | Performed by: STUDENT IN AN ORGANIZED HEALTH CARE EDUCATION/TRAINING PROGRAM

## 2025-01-08 ASSESSMENT — PATIENT HEALTH QUESTIONNAIRE - PHQ9
5. POOR APPETITE OR OVEREATING: 1 - SEVERAL DAYS
CLINICAL INTERPRETATION OF PHQ2 SCORE: 1
SUM OF ALL RESPONSES TO PHQ QUESTIONS 1-9: 7

## 2025-01-08 ASSESSMENT — PAIN SCALES - GENERAL: PAINLEVEL_OUTOF10: 9=SEVERE PAIN

## 2025-01-08 ASSESSMENT — FIBROSIS 4 INDEX: FIB4 SCORE: 0.27

## 2025-01-08 NOTE — PROGRESS NOTES
[facility] (Physical Medicine and Rehabilitation)  Sports Medicine& Interventional Spine   New Patient Visit    Date of Service: 1/8/2025    Chief complaint:   Chief Complaint   Patient presents with    New Patient     LBP        HISTORY    HPI: Jessica Schafer 24 y.o. person who presents today with for initial evaluation of chronic low back pain.    Patient was seen by his primary care provider for similar complaint.  An MRI of the lumbar spine was ordered and patient was prescribed Flexeril 5 to 10 mg 3 times daily as needed, gentle stretches and exercises, and weight reduction.  He was referred to our clinic for further evaluation.    Today patient notes pain at the low back that is worse when walking.  Noted pain to be 2-3 in severity with sitting in 8-9 out of 10 in severity when walking.  Pain is located all throughout the thoracolumbar and lower lumbar back region in the bandlike fashion.  Patient was previously tried cyclobenzaprine as well as meloxicam with minimal improvement in pain.  Patient has not trialed any physical therapy at this time.  Pain has been present for the past few months.  Pain is described as aching burning pins-and-needles with numbness and tingling.  There is no radiation of pain to bilateral lower extremities.  Pain is relieved with sitting and lying down.  Pain is made worse with standing, walking, bending forward, bending backward, side bending twisting, walking up and down stairs.    Patient notes start of back pain at the end of November after having to put chains on tires on recent trip. He did report previous episodes of back pain in high school with some radiation to left leg. This eventually resolved on its own. States this pain is different than previous episodes. There is surging of pain that locks up the back that starts at the low ribs and extends distally to the hip bones on both sides.       Conservative treatments including the past two months within  the past six months  NSAIDs: yes  Acetaminophen: yes  Home exercise program or physical therapy: yes, last completed PT in dec 2019 for at least 6 weeks  Activity modification: unable to walk further than 1 block, only able to stand for about 5-10 minutes at a time. Unable to lift heavy objects    ANTICOAGULATION: none    PROCEDURAL SCREENING:  Previous fainting or vasovagal episodes with any procedure or blood draw : none        Psychological testing for pain as depression and pain commonly coexist and need to both be treated.     Opioid Risk Score: No value filed.      Interpretation of Opioid Risk Score   Score 0-3 = Low risk of abuse. Do UDS at least once per year.  Score 4-7 = Moderate risk of abuse. Do UDS 1-4 times per year.  Score 8+ = High risk of abuse. Refer to specialist.    PHQ      5/18/2023     3:00 PM 1/8/2025     3:40 PM   Depression Screen (PHQ-2/PHQ-9)   PHQ-2 Total Score 0 1   PHQ-9 Total Score  7       Interpretation of PHQ-9 Total Score   Score Severity   1-4 No Depression   5-9 Mild Depression   10-14 Moderate Depression   15-19 Moderately Severe Depression   20-27 Severe Depression      Medical records review:  I reviewed the note from the referring provider Priya Elizabeth A.P.R* dated 12/19/24.     Previous treatments: none    Physical Therapy: Yes    Medications the patient is tried: NSAIDs, tylenol, and muscle relaxers    Previous interventions: none    Previous surgeries to relieve the above pain:  none    ROS:   Red Flags ROS:   Fever, Chills, Sweats: Night sweats, currently on testosterone therapy  Involuntary Weight Loss: Denies  Bladder Incontinence: Denies  Bowel Incontinence: denies  Saddle Anesthesia: Denies    All other systems reviewed and negative.       PMHx:   Past Medical History:   Diagnosis Date    Asthma     Mass of left wrist 05/01/2024    Surgical intervention    NSVT (nonsustained ventricular tachycardia) (HCC)          Current Outpatient Medications on File Prior to  "Visit   Medication Sig Dispense Refill    meloxicam (MOBIC) 15 MG tablet Take 1 Tablet by mouth every day. 90 Tablet 3    Semaglutide (WEGOVY) 0.5 MG/0.5ML Solution Auto-injector Pen-injector Inject 0.5 mL under the skin every 7 days. 2 mL 1    testosterone cypionate (DEPO-TESTOSTERONE) 200 MG/ML injection Inject 50 mg into the shoulder, thigh, or buttocks one time.      BD SYRINGE SLIP TIP 25G X 5/8\" 1 ML Misc USE AS DIRECTED TO INJECT MEDICATION SUBCUTANEOUSLY      ondansetron (ZOFRAN) 4 MG Tab tablet Take 1 Tablet by mouth every four hours as needed for Nausea/Vomiting. 20 Tablet 11    CIMZIA, 2 SYRINGE, 200 MG/ML Prefilled Syringe Kit       albuterol 108 (90 Base) MCG/ACT Aero Soln inhalation aerosol albuterol sulfate HFA 90 mcg/actuation aerosol inhaler   Inhale 2 puffs every 4 hours by inhalation route as needed.   Take as needed for shortness of breath and/or wheezing; take before exercise       No current facility-administered medications on file prior to visit.        PSHx:   Past Surgical History:   Procedure Laterality Date    PB EXC TUMOR SOFT TISS FOREARM&/WRIST SUBFAS* Left 7/23/2024    Procedure: LEFT WRIST MASS EXCISION;  Surgeon: Juarez Ordonez M.D.;  Location: [facility];  Service: Orthopedics     Family history   Family History   Problem Relation Age of Onset    Hypertension Mother     Thyroid Mother         hypothyroid    Hypertension Father     Depression Father     Anxiety disorder Father     Bipolar disorder Father     Depression Sister     Other Sister         PCOS    Depression Brother     Other Maternal Grandmother         AML    Diabetes Maternal Grandfather     Cancer Maternal Grandfather         pancreatic    Cancer Paternal Grandmother         skin    Lung Disease Paternal Grandmother         COPD, SMK     Medications: reviewed on epic.   Outpatient Medications Marked as Taking for the 1/8/25 encounter (Office Visit) with Олег Hayward D.O.   Medication Sig " "Dispense Refill    tizanidine (ZANAFLEX) 4 MG Tab Take 1 Tablet by mouth at bedtime as needed (muscle spasms). 30 Tablet 2    meloxicam (MOBIC) 15 MG tablet Take 1 Tablet by mouth every day. 90 Tablet 3    Semaglutide (WEGOVY) 0.5 MG/0.5ML Solution Auto-injector Pen-injector Inject 0.5 mL under the skin every 7 days. 2 mL 1    testosterone cypionate (DEPO-TESTOSTERONE) 200 MG/ML injection Inject 50 mg into the shoulder, thigh, or buttocks one time.      BD SYRINGE SLIP TIP 25G X 5/8\" 1 ML Misc USE AS DIRECTED TO INJECT MEDICATION SUBCUTANEOUSLY      ondansetron (ZOFRAN) 4 MG Tab tablet Take 1 Tablet by mouth every four hours as needed for Nausea/Vomiting. 20 Tablet 11    CIMZIA, 2 SYRINGE, 200 MG/ML Prefilled Syringe Kit       albuterol 108 (90 Base) MCG/ACT Aero Soln inhalation aerosol albuterol sulfate HFA 90 mcg/actuation aerosol inhaler   Inhale 2 puffs every 4 hours by inhalation route as needed.   Take as needed for shortness of breath and/or wheezing; take before exercise        Allergies:   No Known Allergies  Social Hx:   Social History     Socioeconomic History    Marital status: Single     Spouse name: Not on file    Number of children: Not on file    Years of education: Not on file    Highest education level: Not on file   Occupational History    Not on file   Tobacco Use    Smoking status: Never    Smokeless tobacco: Never   Vaping Use    Vaping status: Former   Substance and Sexual Activity    Alcohol use: Not Currently     Comment: OCC    Drug use: Never    Sexual activity: Yes     Partners: Male, Female   Other Topics Concern    Not on file   Social History Narrative    Not on file     Social Drivers of Health     Financial Resource Strain: Not on file   Food Insecurity: Not on file   Transportation Needs: Not on file   Physical Activity: Not on file   Stress: Not on file   Social Connections: Not on file   Intimate Partner Violence: Not on file   Housing Stability: Not on file         EXAMINATION " "    Physical Exam:   Vitals: /88 (BP Location: Right arm, Patient Position: Sitting, BP Cuff Size: Adult)   Pulse 100   Temp 36.3 °C (97.4 °F) (Temporal)   Ht 1.778 m (5' 10\")   Wt (!) 186 kg (410 lb)   SpO2 96%     Constitutional:   Body Habitus: Body mass index is 58.83 kg/m².  Cooperation: Fully cooperates with exam  Appearance: Well-groomed, well-nourished, not disheveled     Eyes: No scleral icterus to suggest severe liver disease, no proptosis to suggest severe hyperthyroid  ENT -no obvious auditory deficits, no obvious tongue lesions, tongue midline, no facial droop   Skin -no rashes or lesions noted   Respiratory-  breathing comfortable on room air, no audible wheezing  Cardiovascular- capillary refills less than 2 seconds. No lower extremity edema is noted.   Gastrointestinal - no obvious abdominal masses, No tenderness to palpation in the abdomen  Psychiatric- alert and oriented ×3. Normal affect.   Gait - normal gait, no use of ambulatory device, nonantalgic.     Musculoskeletal -     Thoracic/Lumbar Spine/Sacral Spine/Hips   Inspection: No evidence of atrophy in bilateral lower extremities throughout   ROM: full  AROM with flexion, extension, lateral flexion, and rotation bilaterally, without pain   Palpation:   TTP at midline and paraspinal musculature and area of fold in skin at the level of L2-4.   palpation over SI joint: negative bilaterally    palpation over buttock: negative bilaterally    palpation in hip or over the greater trochanters: Which my mom never cut my hairnegative bilaterally      Lumbar spine Special tests  Neuro tension  Straight leg test negative bilaterally    Slump test right I had really curly wavy hairnegative bilaterally      HIP  FAIR test negative bilaterally    Range of motion in the hips is within normal limits in flexion, extension, abduction, internal rotation, external rotation.    SI joint tests  Observation patient sits on one buttocks: Negative  SI joint " "compression negative bilaterally    SI joint distraction yeahnegative bilaterally    Thigh thrust test negative bilaterally    DELONTE test negative bilaterally     Neuro   Key points for the international standards for neurological classification of spinal cord injury (ISNCSCI) to light touch.   Dermatome R L   L2 2 2   L3 2 2   L4 2 2   L5 2 2   S1 2 2   S2 2 2     Motor Exam Lower Extremities  ? Myotome R L   Hip flexion L2 What you would like me 5 5   Knee extension L3 5 5   Ankle dorsiflexion L4 5 5   Toe extension L5 5 5   Ankle plantarflexion S1 5 5       Babinski sign he would like me  Clonus of the ankle negative bilaterally     Reflexes  ?  R L   Patella  2+ 2+   Achilles   2+ 2+     MEDICAL DECISION MAKING    Medical records review: see under HPI section.     DATA    Labs:   Lab Results   Component Value Date/Time    SODIUM 139 10/29/2024 02:30 PM    POTASSIUM 3.7 10/29/2024 02:30 PM    CHLORIDE 102 10/29/2024 02:30 PM    CO2 24 10/29/2024 02:30 PM    ANION 13.0 10/29/2024 02:30 PM    GLUCOSE 89 10/29/2024 02:30 PM    BUN 15 10/29/2024 02:30 PM    CREATININE 0.76 10/29/2024 02:30 PM    CALCIUM 9.5 10/29/2024 02:30 PM    ASTSGOT 23 10/29/2024 02:30 PM    ALTSGPT 30 10/29/2024 02:30 PM    TBILIRUBIN 0.4 10/29/2024 02:30 PM    ALBUMIN 4.1 10/29/2024 02:30 PM    TOTPROTEIN 7.4 10/29/2024 02:30 PM    GLOBULIN 3.3 10/29/2024 02:30 PM    AGRATIO 1.2 10/29/2024 02:30 PM   ]    No results found for: \"PROTHROMBTM\", \"INR\"     Lab Results   Component Value Date/Time    WBC 10.5 10/29/2024 02:30 PM    RBC 4.97 10/29/2024 02:30 PM    HEMOGLOBIN 15.0 10/29/2024 02:30 PM    HEMATOCRIT 44.0 10/29/2024 02:30 PM    MCV 88.5 10/29/2024 02:30 PM    MCH 30.2 10/29/2024 02:30 PM    MCHC 34.1 10/29/2024 02:30 PM    MPV 9.3 10/29/2024 02:30 PM    NEUTSPOLYS 72.00 10/29/2024 02:30 PM    LYMPHOCYTES 21.20 (L) 10/29/2024 02:30 PM    MONOCYTES 5.20 10/29/2024 02:30 PM    EOSINOPHILS 0.60 10/29/2024 02:30 PM    BASOPHILS 0.40 10/29/2024 " 02:30 PM        Lab Results   Component Value Date/Time    HBA1C 4.9 10/29/2024 02:30 PM        Imaging:   I personally reviewed following images, these are my reads  X-ray of the lumbar spine dated 12/6/2021 showed no obvious fractures, mild levoscoliosis, otherwise normal study    Diagnosis   Visit Diagnoses     ICD-10-CM   1. Chronic bilateral low back pain without sciatica  M54.50    G89.29         ASSESSMENT AND PLAN:  Jessica Jessicajenniffer Schafer 24 y.o. person who presents today for chronic bilateral back pain. Patient notes pulsating pain at low back that does not radiate to low back pain. Recent X ray of the lumbar spine did not show any abnormalities. On examination there is significant midline and paraspinal tenderness at the level of L2-4 where there is a skin fold. She has been recommended Physical therapy and has failed other oral medications including NSAIDs, tylenol, and muscle relaxant. I recommend MRI of lumbar spine for further evaluation. I also provided script for a different muscle relaxant, tizanidine 4mg QHS. Recommend patient return to clinic after MRI for further evaluation and management recommendations.      Rick was seen today for new patient.    Diagnoses and all orders for this visit:    Chronic bilateral low back pain without sciatica  -     MR-LUMBAR SPINE-W/O; Future  -     Referral to Physical Therapy    Other orders  -     tizanidine (ZANAFLEX) 4 MG Tab; Take 1 Tablet by mouth at bedtime as needed (muscle spasms).        PLAN  Physical therapy: PT has been ordered    home exercise program: I encouraged regular strengthening and stretching with a home exercise program     Diagnostic workup: MRI of the lumbar spine    Medications: Continue NSAIDs and tylenol as tolerated. Recommend tizanidine 4mg qhs    Interventional program: will defer until after MRI, consider Medial branch blockade vs epidural steroid injection.        Follow-up:After MRI as above      Please note that this  dictation was created using voice recognition software. I have made every reasonable attempt to correct obvious errors but there may be errors of grammar and content that I may have overlooked prior to finalization of this note.    Олег Hayward DO  Physical Medicine and Rehabilitation  Sports Medicine and Spine  [facility]     CC Priya Elizabeth, [email or web address]* .

## 2025-01-14 DIAGNOSIS — T50.905A MEDICATION SIDE EFFECT, INITIAL ENCOUNTER: ICD-10-CM

## 2025-01-15 ENCOUNTER — HOSPITAL ENCOUNTER (OUTPATIENT)
Dept: LAB | Facility: MEDICAL CENTER | Age: 25
End: 2025-01-15
Attending: FAMILY MEDICINE
Payer: COMMERCIAL

## 2025-01-15 LAB
25(OH)D3 SERPL-MCNC: 15 NG/ML (ref 30–100)
BASOPHILS # BLD AUTO: 0.3 % (ref 0–1.8)
BASOPHILS # BLD: 0.04 K/UL (ref 0–0.12)
EOSINOPHIL # BLD AUTO: 0.06 K/UL (ref 0–0.51)
EOSINOPHIL NFR BLD: 0.5 % (ref 0–6.9)
ERYTHROCYTE [DISTWIDTH] IN BLOOD BY AUTOMATED COUNT: 41.5 FL (ref 35.9–50)
HCT VFR BLD AUTO: 47.8 % (ref 37–47)
HGB BLD-MCNC: 16.3 G/DL (ref 12–16)
IMM GRANULOCYTES # BLD AUTO: 0.05 K/UL (ref 0–0.11)
IMM GRANULOCYTES NFR BLD AUTO: 0.4 % (ref 0–0.9)
LYMPHOCYTES # BLD AUTO: 3.42 K/UL (ref 1–4.8)
LYMPHOCYTES NFR BLD: 28.6 % (ref 22–41)
MCH RBC QN AUTO: 30 PG (ref 27–33)
MCHC RBC AUTO-ENTMCNC: 34.1 G/DL (ref 32.2–35.5)
MCV RBC AUTO: 87.9 FL (ref 81.4–97.8)
MONOCYTES # BLD AUTO: 0.86 K/UL (ref 0–0.85)
MONOCYTES NFR BLD AUTO: 7.2 % (ref 0–13.4)
NEUTROPHILS # BLD AUTO: 7.53 K/UL (ref 1.82–7.42)
NEUTROPHILS NFR BLD: 63 % (ref 44–72)
NRBC # BLD AUTO: 0 K/UL
NRBC BLD-RTO: 0 /100 WBC (ref 0–0.2)
PLATELET # BLD AUTO: 366 K/UL (ref 164–446)
PMV BLD AUTO: 9.2 FL (ref 9–12.9)
RBC # BLD AUTO: 5.44 M/UL (ref 4.2–5.4)
WBC # BLD AUTO: 12 K/UL (ref 4.8–10.8)

## 2025-01-15 PROCEDURE — 85025 COMPLETE CBC W/AUTO DIFF WBC: CPT

## 2025-01-15 PROCEDURE — 84403 ASSAY OF TOTAL TESTOSTERONE: CPT

## 2025-01-15 PROCEDURE — 82306 VITAMIN D 25 HYDROXY: CPT

## 2025-01-15 PROCEDURE — 36415 COLL VENOUS BLD VENIPUNCTURE: CPT

## 2025-01-15 PROCEDURE — 82670 ASSAY OF TOTAL ESTRADIOL: CPT

## 2025-01-16 LAB — ESTRADIOL SERPL-MCNC: 62.4 PG/ML

## 2025-01-16 RX ORDER — ONDANSETRON 4 MG/1
4 TABLET, FILM COATED ORAL EVERY 4 HOURS PRN
Qty: 20 TABLET | Refills: 11 | Status: SHIPPED | OUTPATIENT
Start: 2025-01-16

## 2025-01-19 LAB — TESTOST SERPL-MCNC: 379 NG/DL (ref 9–55)

## 2025-01-21 ENCOUNTER — APPOINTMENT (OUTPATIENT)
Dept: RADIOLOGY | Facility: MEDICAL CENTER | Age: 25
End: 2025-01-21
Attending: STUDENT IN AN ORGANIZED HEALTH CARE EDUCATION/TRAINING PROGRAM
Payer: COMMERCIAL

## 2025-01-21 DIAGNOSIS — G89.29 CHRONIC BILATERAL LOW BACK PAIN WITHOUT SCIATICA: ICD-10-CM

## 2025-01-21 DIAGNOSIS — M54.50 CHRONIC BILATERAL LOW BACK PAIN WITHOUT SCIATICA: ICD-10-CM

## 2025-01-21 PROCEDURE — 72148 MRI LUMBAR SPINE W/O DYE: CPT

## 2025-01-22 ENCOUNTER — OFFICE VISIT (OUTPATIENT)
Dept: PHYSICAL MEDICINE AND REHAB | Facility: MEDICAL CENTER | Age: 25
End: 2025-01-22
Payer: COMMERCIAL

## 2025-01-22 VITALS
DIASTOLIC BLOOD PRESSURE: 88 MMHG | WEIGHT: 293 LBS | HEIGHT: 70 IN | HEART RATE: 102 BPM | TEMPERATURE: 97.6 F | SYSTOLIC BLOOD PRESSURE: 126 MMHG | BODY MASS INDEX: 41.95 KG/M2 | OXYGEN SATURATION: 96 %

## 2025-01-22 DIAGNOSIS — G89.29 CHRONIC BILATERAL LOW BACK PAIN WITHOUT SCIATICA: ICD-10-CM

## 2025-01-22 DIAGNOSIS — M54.50 CHRONIC BILATERAL LOW BACK PAIN WITHOUT SCIATICA: ICD-10-CM

## 2025-01-22 DIAGNOSIS — E66.01 SEVERE OBESITY (BMI >= 40) (HCC): ICD-10-CM

## 2025-01-22 PROCEDURE — 3079F DIAST BP 80-89 MM HG: CPT | Performed by: STUDENT IN AN ORGANIZED HEALTH CARE EDUCATION/TRAINING PROGRAM

## 2025-01-22 PROCEDURE — 1125F AMNT PAIN NOTED PAIN PRSNT: CPT | Performed by: STUDENT IN AN ORGANIZED HEALTH CARE EDUCATION/TRAINING PROGRAM

## 2025-01-22 PROCEDURE — 99213 OFFICE O/P EST LOW 20 MIN: CPT | Performed by: STUDENT IN AN ORGANIZED HEALTH CARE EDUCATION/TRAINING PROGRAM

## 2025-01-22 PROCEDURE — 3074F SYST BP LT 130 MM HG: CPT | Performed by: STUDENT IN AN ORGANIZED HEALTH CARE EDUCATION/TRAINING PROGRAM

## 2025-01-22 RX ORDER — METHOCARBAMOL 750 MG/1
TABLET ORAL
COMMUNITY
Start: 2025-01-20

## 2025-01-22 ASSESSMENT — FIBROSIS 4 INDEX: FIB4 SCORE: 0.25

## 2025-01-22 ASSESSMENT — PAIN SCALES - GENERAL: PAINLEVEL_OUTOF10: 8=MODERATE-SEVERE PAIN

## 2025-01-22 NOTE — PROGRESS NOTES
[facility] (Physical Medicine and Rehabilitation)  Sports Medicine& Interventional Spine   Follow Up Patient Visit      Chief complaint:   Chief Complaint   Patient presents with    Follow-Up     Image review          HISTORY        HPI  Patient identification: Jessica Schafer ,  2000,   With Diagnoses of Chronic bilateral low back pain without sciatica and Severe obesity (BMI >= 40) (Formerly [facility]) were pertinent to this visit.         At last visit dated 25  Jessica Schafer 24 y.o. person who presents today for chronic bilateral back pain. Patient notes pulsating pain at low back that does not radiate to low back pain. Recent X ray of the lumbar spine did not show any abnormalities. On examination there is significant midline and paraspinal tenderness at the level of L2-4 where there is a skin fold. She has been recommended Physical therapy and has failed other oral medications including NSAIDs, tylenol, and muscle relaxant. I recommend MRI of lumbar spine for further evaluation. I also provided script for a different muscle relaxant, tizanidine 4mg QHS. Recommend patient return to clinic after MRI for further evaluation and management recommendations.     Interval hx:  PresentsToday for repeat evaluation of chronic low back pain.  At last visit patient was recommended MRI for further evaluation of neck pain.  Patient is here today to review results of that imaging.  Today patient reports 7-8 out of 10 in intensity pain throughout the low back.  Pain is predominantly localized to the axial spine without radiation to lower extremities. Standing has become easier in the past few weeks. Still taking muscle relaxer and meloxicam, notes no real difference with change in muscle relaxer.        ROS Red Flags :   Fever, Chills, Sweats: Denies  Involuntary Weight Loss: Denies  Bowel/Bladder Incontinence: Denies  Saddle Anesthesia: Denies        PMHx:   Past Medical History:   Diagnosis Date     "Asthma     Mass of left wrist 05/01/2024    Surgical intervention    NSVT (nonsustained ventricular tachycardia) (HCC)        PSHx:   Past Surgical History:   Procedure Laterality Date    PB EXC TUMOR SOFT TISS FOREARM&/WRIST SUBFAS* Left 7/23/2024    Procedure: LEFT WRIST MASS EXCISION;  Surgeon: Juarez Ordonez M.D.;  Location: [facility];  Service: Orthopedics       Family history   Family History   Problem Relation Age of Onset    Hypertension Mother     Thyroid Mother         hypothyroid    Hypertension Father     Depression Father     Anxiety disorder Father     Bipolar disorder Father     Depression Sister     Other Sister         PCOS    Depression Brother     Other Maternal Grandmother         AML    Diabetes Maternal Grandfather     Cancer Maternal Grandfather         pancreatic    Cancer Paternal Grandmother         skin    Lung Disease Paternal Grandmother         COPD, SMK         Medications:   Outpatient Medications Marked as Taking for the 1/22/25 encounter (Office Visit) with Олег Hayward D.O.   Medication Sig Dispense Refill    D3-50 1.25 MG (84083 UT) Cap TAKE 1 CAPSULE BY MOUTH ONE DAY A WEEK FOR 8 WEEKS      ondansetron (ZOFRAN) 4 MG Tab tablet Take 1 Tablet by mouth every four hours as needed for Nausea/Vomiting. 20 Tablet 11    tizanidine (ZANAFLEX) 4 MG Tab Take 1 Tablet by mouth at bedtime as needed (muscle spasms). 30 Tablet 2    meloxicam (MOBIC) 15 MG tablet Take 1 Tablet by mouth every day. 90 Tablet 3    Semaglutide (WEGOVY) 0.5 MG/0.5ML Solution Auto-injector Pen-injector Inject 0.5 mL under the skin every 7 days. 2 mL 1    testosterone cypionate (DEPO-TESTOSTERONE) 200 MG/ML injection Inject 50 mg into the shoulder, thigh, or buttocks one time.      BD SYRINGE SLIP TIP 25G X 5/8\" 1 ML Misc USE AS DIRECTED TO INJECT MEDICATION SUBCUTANEOUSLY      CIMZIA, 2 SYRINGE, 200 MG/ML Prefilled Syringe Kit       albuterol 108 (90 Base) MCG/ACT Aero Soln inhalation " "aerosol albuterol sulfate HFA 90 mcg/actuation aerosol inhaler   Inhale 2 puffs every 4 hours by inhalation route as needed.   Take as needed for shortness of breath and/or wheezing; take before exercise          Current Outpatient Medications on File Prior to Visit   Medication Sig Dispense Refill    D3-50 1.25 MG (17576 UT) Cap TAKE 1 CAPSULE BY MOUTH ONE DAY A WEEK FOR 8 WEEKS      ondansetron (ZOFRAN) 4 MG Tab tablet Take 1 Tablet by mouth every four hours as needed for Nausea/Vomiting. 20 Tablet 11    tizanidine (ZANAFLEX) 4 MG Tab Take 1 Tablet by mouth at bedtime as needed (muscle spasms). 30 Tablet 2    meloxicam (MOBIC) 15 MG tablet Take 1 Tablet by mouth every day. 90 Tablet 3    Semaglutide (WEGOVY) 0.5 MG/0.5ML Solution Auto-injector Pen-injector Inject 0.5 mL under the skin every 7 days. 2 mL 1    testosterone cypionate (DEPO-TESTOSTERONE) 200 MG/ML injection Inject 50 mg into the shoulder, thigh, or buttocks one time.      BD SYRINGE SLIP TIP 25G X 5/8\" 1 ML Misc USE AS DIRECTED TO INJECT MEDICATION SUBCUTANEOUSLY      CIMZIA, 2 SYRINGE, 200 MG/ML Prefilled Syringe Kit       albuterol 108 (90 Base) MCG/ACT Aero Soln inhalation aerosol albuterol sulfate HFA 90 mcg/actuation aerosol inhaler   Inhale 2 puffs every 4 hours by inhalation route as needed.   Take as needed for shortness of breath and/or wheezing; take before exercise       No current facility-administered medications on file prior to visit.         Allergies:   No Known Allergies    Social Hx:   Social History     Socioeconomic History    Marital status: Single     Spouse name: Not on file    Number of children: Not on file    Years of education: Not on file    Highest education level: Not on file   Occupational History    Not on file   Tobacco Use    Smoking status: Never    Smokeless tobacco: Never   Vaping Use    Vaping status: Former   Substance and Sexual Activity    Alcohol use: Not Currently     Comment: OCC    Drug use: Never    " "Sexual activity: Yes     Partners: Male, Female   Other Topics Concern    Not on file   Social History Narrative    Not on file     Social Drivers of Health     Financial Resource Strain: Not on file   Food Insecurity: Not on file   Transportation Needs: Not on file   Physical Activity: Not on file   Stress: Not on file   Social Connections: Not on file   Intimate Partner Violence: Not on file   Housing Stability: Not on file         EXAMINATION     Physical Exam:   Vitals: /88 (BP Location: Right arm, Patient Position: Sitting, BP Cuff Size: Adult)   Pulse (!) 102   Temp 36.4 °C (97.6 °F) (Temporal)   Ht 1.778 m (5' 10\")   Wt (!) 186 kg (410 lb)   SpO2 96%     Constitutional:   Body Habitus: Body mass index is 58.83 kg/m².  Cooperation: Fully cooperates with exam  Appearance: Well-groomed no disheveled    Respiratory-  breathing comfortable on room air, no audible wheezing  Cardiovascular- capillary refills less than 2 seconds. No lower extremity edema is noted.   Psychiatric- alert and oriented ×3. Normal affect.    MSK and Neuro: -    Thoracic/Lumbar Spine/Sacral Spine/Hips   Inspection: No evidence of atrophy in bilateral lower extremities throughout   ROM: full  AROM with flexion, extension, lateral flexion, and rotation bilaterally, without pain   Palpation:   TTP at midline and paraspinal musculature and area of fold in skin at the level of L2-4.   palpation over SI joint: negative bilaterally    palpation over buttock: negative bilaterally    palpation in hip or over the greater trochanters: Which my mom never cut my hairnegative bilaterally       Lumbar spine Special tests  Neuro tension  Straight leg test negative bilaterally    Slump test right I had really curly wavy hairnegative bilaterally       HIP  FAIR test negative bilaterally    Range of motion in the hips is within normal limits in flexion, extension, abduction, internal rotation, external rotation.     SI joint tests  Observation " "patient sits on one buttocks: Negative  SI joint compression negative bilaterally    SI joint distraction yeahnegative bilaterally    Thigh thrust test negative bilaterally    DELONTE test negative bilaterally      Neuro   Key points for the international standards for neurological classification of spinal cord injury (ISNCSCI) to light touch.   Dermatome R L   L2 2 2   L3 2 2   L4 2 2   L5 2 2   S1 2 2   S2 2 2      Motor Exam Lower Extremities  ? Myotome R L   Hip flexion L2  5 5   Knee extension L3 5 5   Ankle dorsiflexion L4 5 5   Toe extension L5 5 5   Ankle plantarflexion S1 5 5         Babinski sign neg bilaterally  Clonus of the ankle negative bilaterally          MEDICAL DECISION MAKING    DATA    Labs:   Lab Results   Component Value Date/Time    SODIUM 139 10/29/2024 02:30 PM    POTASSIUM 3.7 10/29/2024 02:30 PM    CHLORIDE 102 10/29/2024 02:30 PM    CO2 24 10/29/2024 02:30 PM    GLUCOSE 89 10/29/2024 02:30 PM    BUN 15 10/29/2024 02:30 PM    CREATININE 0.76 10/29/2024 02:30 PM        No results found for: \"PROTHROMBTM\", \"INR\"     Lab Results   Component Value Date/Time    WBC 12.0 (H) 01/15/2025 03:45 PM    RBC 5.44 (H) 01/15/2025 03:45 PM    HEMOGLOBIN 16.3 (H) 01/15/2025 03:45 PM    HEMATOCRIT 47.8 (H) 01/15/2025 03:45 PM    MCV 87.9 01/15/2025 03:45 PM    MCH 30.0 01/15/2025 03:45 PM    MCHC 34.1 01/15/2025 03:45 PM    MPV 9.2 01/15/2025 03:45 PM    NEUTSPOLYS 63.00 01/15/2025 03:45 PM    LYMPHOCYTES 28.60 01/15/2025 03:45 PM    MONOCYTES 7.20 01/15/2025 03:45 PM    EOSINOPHILS 0.50 01/15/2025 03:45 PM    BASOPHILS 0.30 01/15/2025 03:45 PM        Lab Results   Component Value Date/Time    HBA1C 4.9 10/29/2024 02:30 PM          Imaging:   I personally reviewed following images      MRI of the lumbar spine dated 1/21/2025 shows normal alignment and vertebral height, no obvious acute fractures.  Small central disc protrusion at L5-S1 without canal or neuroforaminal stenosis         I reviewed the following " radiology reports                         Results for orders placed in visit on 01/21/25    MR-LUMBAR SPINE-W/O    Impression  Unremarkable noncontrast MR examination of the lumbar spine except for small central disc protrusion at L5-S1 without causing spinal or neural foraminal stenosis..        Results for orders placed in visit on 01/21/25    MR-LUMBAR SPINE-W/O    Impression  Unremarkable noncontrast MR examination of the lumbar spine except for small central disc protrusion at L5-S1 without causing spinal or neural foraminal stenosis..                                                                                        Results for orders placed during the hospital encounter of 05/19/23    DX-ANKLE 3+ VIEWS LEFT    Impression  No radiographic evidence of enthesopathy, degenerative or inflammatory arthropathy    Mild soft tissue swelling    [place] postoperative change                       Results for orders placed in visit on 11/15/22    DX-KNEE 3 VIEWS RIGHT    Impression  1. No acute osseous abnormality.    2. Unremarkable weightbearing view of the bilateral knees with no malalignment.   Results for orders placed in visit on 11/15/22    DX-KNEES-AP BILATERAL STANDING    Impression  1. No acute osseous abnormality.    2. Unremarkable weightbearing view of the bilateral knees with no malalignment.   Results for orders placed in visit on 09/30/24    DX-KNEE COMPLETE 4+ LEFT   Results for orders placed in visit on 12/05/24    DX-LUMBAR SPINE-2 OR 3 VIEWS    Impression  Mild levoscoliosis. Otherwise, normal.          Results for orders placed during the hospital encounter of 05/19/23    DX-SACROILIAC JOINTS 3+    Impression  No radiographic evidence of degenerative or inflammatory arthropathy          Results for orders placed in visit on 05/01/24    DX-WRIST-COMPLETE 3+ LEFT         DIAGNOSIS   Visit Diagnoses     ICD-10-CM   1. Chronic bilateral low back pain without sciatica  M54.50    G89.29   2. Severe  obesity (BMI >= 40) (Formerly [facility])  E66.01         ASSESSMENT and PLAN:     Jessica Schafer  2000 person who presents today for repeat evaluation for chronic low back pain.  Pain is currently located in the axial spine without radiation to bilateral lower extremities.  He does note mild improvement in pain since previous visit and is able to tolerate standing for long period time.  Notes minimal change in pain with recent addition of tizanidine as muscle relaxer.  Has been continuing to take meloxicam as well.  MRI of the lumbar spine was ordered for further evaluation.  This showed it was an otherwise unremarkable study except for a small central disc protrusion at L5-S1 however this is unlikely the source of patient's current symptomology.  Discussed continued back health strategies including daily activity, strengthening and stretching, physical therapy which is already been ordered, and weight loss.  Patient is understanding.  Recommend patient follow-up in 3 months    Rick was seen today for follow-up.    Diagnoses and all orders for this visit:    Chronic bilateral low back pain without sciatica    Severe obesity (BMI >= 40) (Formerly [facility])            Follow up: 3 months        Please note that this dictation was created using voice recognition software. I have made every reasonable attempt to correct obvious errors but there may be errors of grammar and content that I may have overlooked prior to finalization of this note.    Олег Hayward DO  Physical Medicine and Rehabilitation  Sports Medicine and Spine  [facility]

## 2025-01-27 ENCOUNTER — OFFICE VISIT (OUTPATIENT)
Dept: URGENT CARE | Facility: CLINIC | Age: 25
End: 2025-01-27
Payer: COMMERCIAL

## 2025-01-27 VITALS
WEIGHT: 293 LBS | RESPIRATION RATE: 18 BRPM | HEART RATE: 115 BPM | DIASTOLIC BLOOD PRESSURE: 88 MMHG | BODY MASS INDEX: 41.95 KG/M2 | OXYGEN SATURATION: 96 % | SYSTOLIC BLOOD PRESSURE: 130 MMHG | TEMPERATURE: 98.9 F | HEIGHT: 70 IN

## 2025-01-27 DIAGNOSIS — J06.9 VIRAL URI WITH COUGH: ICD-10-CM

## 2025-01-27 DIAGNOSIS — R68.89 FLU-LIKE SYMPTOMS: ICD-10-CM

## 2025-01-27 DIAGNOSIS — Z87.09 HISTORY OF ASTHMA: ICD-10-CM

## 2025-01-27 LAB
FLUAV RNA SPEC QL NAA+PROBE: NEGATIVE
FLUBV RNA SPEC QL NAA+PROBE: NEGATIVE
RSV RNA SPEC QL NAA+PROBE: NEGATIVE
SARS-COV-2 RNA RESP QL NAA+PROBE: NEGATIVE

## 2025-01-27 PROCEDURE — 3079F DIAST BP 80-89 MM HG: CPT | Performed by: NURSE PRACTITIONER

## 2025-01-27 PROCEDURE — 3075F SYST BP GE 130 - 139MM HG: CPT | Performed by: NURSE PRACTITIONER

## 2025-01-27 PROCEDURE — 99213 OFFICE O/P EST LOW 20 MIN: CPT | Performed by: NURSE PRACTITIONER

## 2025-01-27 PROCEDURE — 0241U POCT CEPHEID COV-2, FLU A/B, RSV - PCR: CPT | Performed by: NURSE PRACTITIONER

## 2025-01-27 RX ORDER — ALBUTEROL SULFATE 90 UG/1
2 INHALANT RESPIRATORY (INHALATION) EVERY 6 HOURS PRN
Qty: 8.5 G | Refills: 0 | Status: SHIPPED | OUTPATIENT
Start: 2025-01-27

## 2025-01-27 ASSESSMENT — ENCOUNTER SYMPTOMS: COUGH: 1

## 2025-01-27 ASSESSMENT — FIBROSIS 4 INDEX: FIB4 SCORE: 0.25

## 2025-01-27 NOTE — LETTER
January 27, 2025    To Whom It May Concern:         This is confirmation that Jessica Schafer attended his scheduled appointment with KAREN Young on 1/27/25.        Please excuse him from work 1/27/25-1/29/25.    Sincerely,      FAN Young.  [phone]

## 2025-01-27 NOTE — PROGRESS NOTES
Subjective     Rick Schafer is a 24 y.o. person who presents with Coronavirus Screening (Patient explains did take a Home COVID Test 1/25/25 it was Negative ) and Otalgia (Rt ear pain x 2 days, possible ear infection patient believes)            Otalgia   There is pain in the right ear. This is a new problem. Episode onset: pt reports new onset of flu like symptoms that started 3 days ago. +cough, congestion and body aches. right ear is painful. no fevers. home covid test 2 days ago was negative. Associated symptoms include coughing. He has tried acetaminophen (mucinex and sudafed) for the symptoms. The treatment provided mild relief.       Review of Systems   HENT:  Positive for congestion and ear pain.    Respiratory:  Positive for cough.    All other systems reviewed and are negative.         Past Medical History:   Diagnosis Date    Asthma     Mass of left wrist 05/01/2024    Surgical intervention    NSVT (nonsustained ventricular tachycardia) (HCC)       Past Surgical History:   Procedure Laterality Date    PB EXC TUMOR SOFT TISS FOREARM&/WRIST SUBFAS* Left 7/23/2024    Procedure: LEFT WRIST MASS EXCISION;  Surgeon: Juarez Ordonez M.D.;  Location: [facility];  Service: Orthopedics      Social History     Socioeconomic History    Marital status: Single     Spouse name: Not on file    Number of children: Not on file    Years of education: Not on file    Highest education level: Not on file   Occupational History    Not on file   Tobacco Use    Smoking status: Never    Smokeless tobacco: Never   Vaping Use    Vaping status: Former   Substance and Sexual Activity    Alcohol use: Not Currently     Comment: OCC    Drug use: Never    Sexual activity: Yes     Partners: Male, Female   Other Topics Concern    Not on file   Social History Narrative    Not on file     Social Drivers of Health     Financial Resource Strain: Not on file   Food Insecurity: Not on file   Transportation  "Needs: Not on file   Physical Activity: Not on file   Stress: Not on file   Social Connections: Not on file   Intimate Partner Violence: Not on file   Housing Stability: Not on file         Objective     /88 (BP Location: Left arm, Patient Position: Sitting, BP Cuff Size: Large adult)   Pulse (!) 115   Temp 37.2 °C (98.9 °F)   Resp 18   Ht 1.778 m (5' 10\")   Wt (!) 185 kg (407 lb)   SpO2 96%   BMI 58.40 kg/m²      Physical Exam  Vitals and nursing note reviewed.   Constitutional:       Appearance: Normal appearance. He is normal weight.   HENT:      Head: Normocephalic and atraumatic.      Right Ear: Tympanic membrane and external ear normal.      Left Ear: Tympanic membrane and external ear normal.      Nose: Congestion present.      Mouth/Throat:      Mouth: Mucous membranes are moist.      Pharynx: Oropharynx is clear.   Eyes:      Extraocular Movements: Extraocular movements intact.      Pupils: Pupils are equal, round, and reactive to light.   Cardiovascular:      Rate and Rhythm: Normal rate and regular rhythm.   Pulmonary:      Effort: Pulmonary effort is normal.      Breath sounds: Normal breath sounds. No wheezing.   Musculoskeletal:         General: Normal range of motion.      Cervical back: Normal range of motion.   Skin:     General: Skin is warm and dry.      Capillary Refill: Capillary refill takes less than 2 seconds.   Neurological:      General: No focal deficit present.      Mental Status: He is alert and oriented to person, place, and time. Mental status is at baseline.   Psychiatric:         Mood and Affect: Mood normal.         Speech: Speech normal.         Thought Content: Thought content normal.         Judgment: Judgment normal.                             Assessment & Plan        Assessment & Plan  Flu-like symptoms    Orders:    POCT CoV-2, Flu A/B, RSV by PCR    History of asthma    Orders:    albuterol 108 (90 Base) MCG/ACT Aero Soln inhalation aerosol; Inhale 2 Puffs every " 6 hours as needed for Shortness of Breath.    Viral URI with cough          Viral panel negative  Inhaler refilled  Continue to use OTC medications as needed for symptom relief  Encouraged rest and plenty of fluids  Work note provided  Please RTC if not feeling better or getting worse  Supportive care, differential diagnoses, and indications for immediate follow-up discussed with patient.    Pathogenesis of diagnosis discussed including typical length and natural progression.    Instructed to return to  or nearest emergency department if symptoms fail to improve, for any change in condition, further concerns, or new concerning symptoms.  Patient states understanding of the plan of care and discharge instructions.

## 2025-02-04 ENCOUNTER — APPOINTMENT (OUTPATIENT)
Dept: MEDICAL GROUP | Facility: MEDICAL CENTER | Age: 25
End: 2025-02-04
Payer: COMMERCIAL

## 2025-02-05 ENCOUNTER — PHYSICAL THERAPY (OUTPATIENT)
Dept: PHYSICAL THERAPY | Facility: REHABILITATION | Age: 25
End: 2025-02-05
Attending: STUDENT IN AN ORGANIZED HEALTH CARE EDUCATION/TRAINING PROGRAM
Payer: COMMERCIAL

## 2025-02-05 DIAGNOSIS — G89.29 CHRONIC BILATERAL LOW BACK PAIN WITHOUT SCIATICA: ICD-10-CM

## 2025-02-05 DIAGNOSIS — M54.50 CHRONIC BILATERAL LOW BACK PAIN WITHOUT SCIATICA: ICD-10-CM

## 2025-02-05 PROCEDURE — 97014 ELECTRIC STIMULATION THERAPY: CPT

## 2025-02-05 PROCEDURE — 97162 PT EVAL MOD COMPLEX 30 MIN: CPT

## 2025-02-05 PROCEDURE — 999999 HB NO CHARGE

## 2025-02-05 NOTE — OP THERAPY EVALUATION
Outpatient Physical Therapy  INITIAL EVALUATION    [facility] [address]  [address]  [place] [address]  Phone:  [phone]  Fax:  [phone]    Date of Evaluation: 2025    Patient: Rick Schafer  YOB: 2000  MRN: [id number]     Referring Provider: Олег Hayward D.O.  [address]  [address]  [place] [address]   Referring Diagnosis Chronic bilateral low back pain without sciatica [M54.50, G89.29]     Time Calculation  Start time: 1530  Stop time: 1630 Time Calculation (min): 60 minutes         Chief Complaint: Back Problem    Visit Diagnoses     ICD-10-CM   1. Chronic bilateral low back pain without sciatica  M54.50    G89.29       Date of onset of impairment: 11/15/2024    Subjective:   History of Present Illness:     Mechanism of injury:  Pt reports low back pain starting in 2024 after putting snow chains on. 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  Pain:     Current pain ratin    Quality:  Tingling, pressure and radiating      Past Medical History:   Diagnosis Date    Asthma     Mass of left wrist 2024    Surgical intervention    NSVT (nonsustained ventricular tachycardia) (HCC)      Past Surgical History:   Procedure Laterality Date    PB EXC TUMOR SOFT TISS FOREARM&/WRIST SUBFAS* Left 2024    Procedure: LEFT WRIST MASS EXCISION;  Surgeon: Juarez Ordonez M.D.;  Location: [facility];  Service: Orthopedics     Social History     Tobacco Use    Smoking status: Never    Smokeless tobacco: Never   Substance Use Topics    Alcohol use: Not Currently     Comment: OCC     Family and Occupational History     Socioeconomic  History    Marital status: Single     Spouse name: Not on file    Number of children: Not on file    Years of education: Not on file    Highest education level: Not on file   Occupational History    Not on file       Objective     General Comments     Spine Comments   L/S AROM:  Flex: 75%  Ext: 100%  LLF: 75% with pain  RLF: 90%    Repeated motions:   Flex: no change  Ext: slight centralization    T/S AROM:  LR: 90%  RR: 90%    Special Tests:  Passive SLR:  neg bilat  Slump: neg bilat    Neuro screen  Myotomes: 5/5 bilat    Hip MMT:   Flex: 5/5 bilat  Ext: L 3+/5, R 4+/5  Abd:4+/5 bilat    Bridge hold: 1min, 10s    Hip PROM:  Flex: WFL bilat  Ext: 5deg bilat  Abd: WFL  IR: WFL  ER: 45deg bilat    PA's: hypomobile L3-S1  Tenderness to palpation: very tender R>L lumbar paraspinals, glute max, and piriformis  Posture: moderate rounded shoulders  Gait: wide SLOAN, hip ER bilat          Therapeutic Exercises (CPT 55845):     1. LTR    2. Piriformis figure 4 stretch    3. Standing lumbar extensions    4. Prone hip extensions    Therapeutic Treatments and Modalities:     1. E Stim Unattended (CPT 19690), IFC to lumbar with MPH prone x15min, no charge    Time-based treatments/modalities:           Assessment, Response and Plan:   Impairments: difficulty performing job, impaired physical strength, lacks appropriate home exercise program, limited mobility and pain with function    Assessment details:  Patient is a 25yo transgender male who presents to PT with signs and symptoms consistent with low back pain with power coordination deficits and mobility deficits. Findings include negative neuro testing, hypomobile and tender PA mobs L3-S1, and limited posterior chain endurance. These deficits affect tolerance for prolonged walking, standing, and sitting required for work and classes. Pt will benefit from skilled therapeutic interventions at this time in order to address functional limitations and help reach their functional  goals.     Barriers to therapy:  Out-of-pocket cost of treatment and poorly tolerated treatments  Prognosis: good    Goals:   Short Term Goals:   Lumbar flexion and R SB AROM improved by 10%   Compliant with HEP 3x/wk or more to improved self efficacy  Short term goal time span:  2-4 weeks      Long Term Goals:    MARIZOL improved by 18% to indicate functional improvement (64% at eval)  Able to lift 50# as required for CNA class  Long term goal time span:  2-4 months    Plan:   Planned therapy interventions:  E Stim Unattended (CPT 85204), Mechanical Traction (CPT 64067), Therapeutic Exercise (CPT 03768) and Manual Therapy (CPT 25445)  Frequency:  1x week  Duration in weeks:  12  Discussed with:  Patient      Functional Assessment Used    MARIZOL: 64% disability    Referring provider co-signature:  I have reviewed this plan of care and my co-signature certifies the need for services.    Certification Period: 02/05/2025 to  05/01/25    Physician Signature: ________________________________ Date: ______________

## 2025-02-06 ASSESSMENT — ENCOUNTER SYMPTOMS
QUALITY: PRESSURE
QUALITY: RADIATING
PAIN SCALE: 8
QUALITY: TINGLING

## 2025-02-12 ENCOUNTER — PHYSICAL THERAPY (OUTPATIENT)
Dept: PHYSICAL THERAPY | Facility: REHABILITATION | Age: 25
End: 2025-02-12
Attending: STUDENT IN AN ORGANIZED HEALTH CARE EDUCATION/TRAINING PROGRAM
Payer: COMMERCIAL

## 2025-02-12 DIAGNOSIS — G89.29 CHRONIC BILATERAL LOW BACK PAIN WITHOUT SCIATICA: ICD-10-CM

## 2025-02-12 DIAGNOSIS — M54.50 CHRONIC BILATERAL LOW BACK PAIN WITHOUT SCIATICA: ICD-10-CM

## 2025-02-12 PROCEDURE — 999999 HB NO CHARGE

## 2025-02-12 PROCEDURE — 97110 THERAPEUTIC EXERCISES: CPT

## 2025-02-12 NOTE — OP THERAPY DAILY TREATMENT
Outpatient Physical Therapy  DAILY TREATMENT     [facility] Physical [address].  [address]  [place] [address]  Phone:  [phone]  Fax:  [phone]    Date: 2025    Patient: Rick Schafer  YOB: 2000  MRN: [id number]     Time Calculation    Start time: 1520  Stop time: 1600 Time Calculation (min): 40 minutes         Chief Complaint: Back Problem    Visit #: 2    SUBJECTIVE:  Stretches given provide mild relief only in the moment. 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     Mechanism of injury:  Pt reports low back pain starting in 2024 after putting snow chains on. 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  Pain:     Current pain ratin    Quality:  Tingling, pressure and radiating    OBJECTIVE:  Current objective measures:     L/S AROM:  Flex: 75%  Ext: 100%  LLF: 75% with pain  RLF: 90%     Repeated motions:   Flex: no change  Ext: slight centralization     T/S AROM:  LR: 90%  RR: 90%     Hip MMT:   Flex: 5/5 bilat  Ext: L 3+/5, R 4+/5  Abd:4+/5 bilat     Bridge hold: 1min, 10s     Hip PROM:  Flex: WFL bilat  Ext: 5deg bilat  Abd: WFL  IR: WFL  ER: 45deg bilat     PA's: hypomobile L3-S1  Tenderness to palpation: very tender R>L lumbar paraspinals, glute max, and piriformis  Posture: moderate rounded shoulders  Gait: wide SLOAN, hip ER bilat          Therapeutic Exercises (CPT 88894):     1. LTR    2. Piriformis figure 4 stretch    3. Standing lumbar extensions, NT    4. Prone hip extensions, NT    5. SLR, to fatigue    6. Clamshells, to fatigue    7. Bridges, knees on bolster, 10s holds, to fatigue    8. Supine marches with TrA bracing, to  fatigue    Therapeutic Treatments and Modalities:     1. E Stim Unattended (CPT 48526), IFC to lumbar with MPH prone x15min, no charge    Time-based treatments/modalities:    Physical Therapy Timed Treatment Charges  Therapeutic exercise minutes (CPT 71003): 30 minutes      ASSESSMENT:   Response to treatment: pt tolerated given therex for intro hip and core strengthening without exacerbation of symptoms, discussed utilizing breathing technique for coping for stress due to life changes and starting new class, pt mentioned addition of left ovary pain with starting to take testosterone which is similar time to start of low back pain as well, encouraged pt to discuss this with his primary as well    Goals:   Short Term Goals:   Lumbar flexion and R SB AROM improved by 10%   Compliant with HEP 3x/wk or more to improved self efficacy  Short term goal time span:  2-4 weeks      Long Term Goals:    MARIZOL improved by 18% to indicate functional improvement (64% at eval)  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  Planned interventions for next visit: continue with current treatment.

## 2025-02-19 ENCOUNTER — PHYSICAL THERAPY (OUTPATIENT)
Dept: PHYSICAL THERAPY | Facility: REHABILITATION | Age: 25
End: 2025-02-19
Attending: STUDENT IN AN ORGANIZED HEALTH CARE EDUCATION/TRAINING PROGRAM
Payer: COMMERCIAL

## 2025-02-19 DIAGNOSIS — M54.50 CHRONIC BILATERAL LOW BACK PAIN WITHOUT SCIATICA: ICD-10-CM

## 2025-02-19 DIAGNOSIS — G89.29 CHRONIC BILATERAL LOW BACK PAIN WITHOUT SCIATICA: ICD-10-CM

## 2025-02-19 PROCEDURE — 97110 THERAPEUTIC EXERCISES: CPT

## 2025-02-19 NOTE — OP THERAPY DAILY TREATMENT
Outpatient Physical Therapy  DAILY TREATMENT     [facility] Physical Therapy [address].  [address]  [place] [address]  Phone:  [phone]  Fax:  [phone]    Date: 2025    Patient: Rick Schafer  YOB: 2000  MRN: [id number]     Time Calculation    Start time: 1515  Stop time: 1552 Time Calculation (min): 37 minutes         Chief Complaint: Back Problem    Visit #: 3    SUBJECTIVE:  Ordered his own TENs unit and this is provides some pain relief    Mechanism of injury:  Pt reports low back pain starting in 2024 after putting snow chains on. 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  Pain:     Current pain ratin    Quality:  Tingling, pressure and radiating    OBJECTIVE:  Current objective measures:     L/S AROM:  Flex: 75%  Ext: 100%  LLF: 75% with pain  RLF: 90%     Repeated motions:   Flex: no change  Ext: slight centralization     T/S AROM:  LR: 90%  RR: 90%     Hip MMT:   Flex: 5/5 bilat  Ext: L 3+/5, R 4+/5  Abd:4+/5 bilat     Bridge hold: 1min, 10s     Hip PROM:  Flex: WFL bilat  Ext: 5deg bilat  Abd: WFL  IR: WFL  ER: 45deg bilat     PA's: hypomobile L3-S1  Tenderness to palpation: very tender R>L lumbar paraspinals, glute max, and piriformis  Posture: moderate rounded shoulders  Gait: wide SLOAN, hip ER bilat          Therapeutic Exercises (CPT 39637):     1. LTR, NT    2. Piriformis figure 4 stretch, NT    3. Standing lumbar extensions, NT    4. Prone hip extensions, NT    5. SLR, to fatigue, NT    6. Clamshells, to fatigue, NT    7. Bridges, knees on bolster, 10s holds, to fatigue, NT    8. Supine marches with TrA bracing, to fatigue, NT    10. Ball rollouts, as needed  between sets    11. lat walks, BTB around knees 3x10 steps    12. rows/ext, GTB 2x10 ea    13. wall push ups, 2x10    14. paloff press, BTB 8x5s holds eaa    15. Lat pulldowns, 60# to fatigue      Time-based treatments/modalities:    Physical Therapy Timed Treatment Charges  Therapeutic exercise minutes (CPT 78657): 37 minutes      ASSESSMENT:   Response to treatment: pt moderately tolerated progressions today adding standing therex for core strengthening with seated active rest breaks, plan to continue functional strengthening as tolerated to dec irritability and improve load capacity to work toward goals of tolerating CNA class clinical hours    Goals:   Short Term Goals:   Lumbar flexion and R SB AROM improved by 10%   Compliant with HEP 3x/wk or more to improved self efficacy  Short term goal time span:  2-4 weeks      Long Term Goals:    MARIZOL improved by 18% to indicate functional improvement (64% at eval)  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  Planned interventions for next visit: continue with current treatment.

## 2025-02-26 ENCOUNTER — PHYSICAL THERAPY (OUTPATIENT)
Dept: PHYSICAL THERAPY | Facility: REHABILITATION | Age: 25
End: 2025-02-26
Attending: STUDENT IN AN ORGANIZED HEALTH CARE EDUCATION/TRAINING PROGRAM
Payer: COMMERCIAL

## 2025-02-26 DIAGNOSIS — M54.50 CHRONIC BILATERAL LOW BACK PAIN WITHOUT SCIATICA: ICD-10-CM

## 2025-02-26 DIAGNOSIS — G89.29 CHRONIC BILATERAL LOW BACK PAIN WITHOUT SCIATICA: ICD-10-CM

## 2025-02-26 PROCEDURE — 97110 THERAPEUTIC EXERCISES: CPT

## 2025-02-26 PROCEDURE — 97014 ELECTRIC STIMULATION THERAPY: CPT

## 2025-02-26 NOTE — OP THERAPY DAILY TREATMENT
"  Outpatient Physical Therapy  DAILY TREATMENT     [facility] Physical [address].  [address]  [place] [address]  Phone:  [phone]  Fax:  [phone]    Date: 2025    Patient: Rick Schafer  YOB: 2000  MRN: [id number]     Time Calculation    Start time: 1455  Stop time: 1545 Time Calculation (min): 50 minutes         Chief Complaint: Back Problem    Visit #: 4    SUBJECTIVE:  \"My back hurts all over\" he states that over the last few weeks his pain is probably about the same intensity in the low back but has spread to upper back and pain with any prolonged position rather than just standing. He also reports that he is not able to get quality sleep and cannot find comfortable sleeping positions     Mechanism of injury:  Pt reports low back pain starting in 2024 after putting snow chains on. 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  Pain:     Current pain ratin    Quality:  Tingling, pressure and radiating    OBJECTIVE:  Current objective measures:     L/S AROM:  Flex: 75%  Ext: 100%  LLF: 75% with pain  RLF: 90%     Repeated motions:   Flex: no change  Ext: slight centralization     T/S AROM:  LR: 90%  RR: 90%     Hip MMT:   Flex: 5/5 bilat  Ext: L 3+/5, R 4+/5  Abd:4+/5 bilat     Bridge hold: 1min, 10s     Hip PROM:  Flex: WFL bilat  Ext: 5deg bilat  Abd: WFL  IR: WFL  ER: 45deg bilat     PA's: hypomobile L3-S1  Tenderness to palpation: very tender R>L lumbar paraspinals, glute max, and piriformis  Posture: moderate rounded shoulders  Gait: wide SLOAN, hip ER bilat          Therapeutic Exercises (CPT 76275):     1. LTR, NT    2. Piriformis figure 4 stretch, NT    3. " Standing lumbar extensions, NT    4. Prone hip extensions, NT    5. SLR, to fatigue, NT    6. Clamshells, to fatigue, NT    7. Bridges, knees on bolster, 10s holds, to fatigue, NT    8. Supine marches with TrA bracing, to fatigue, NT    10. Ball rollouts, as needed between sets    11. lat walks, BTB around knees 3x10 steps, NT    12. rows/ext, GTB 2x10 ea    13. wall push ups, 2x10    14. paloff press, BTB 8x5s holds eaa, NT    15. Lat pulldowns, 60# to fatigue, NT    16. Step downs 4in, 2x6 ea    17. Squat to bench, 2x10    18. Prone opp arm/leg lifts, 10x5s holds    20. Recumbent bike 7min    Therapeutic Treatments and Modalities:     1. E Stim Attended (CPT 24254), IFC with ice pack to low back prone x15    Time-based treatments/modalities:    Physical Therapy Timed Treatment Charges  Therapeutic exercise minutes (CPT 64009): 35 minutes      ASSESSMENT:   Pt with worsening symptoms and more frequent L leg numbness not relieved or centralized with repetitive motions, however he did tolerate standing progressions into functional strengthening at low reps without increase of pain then relief with following IFC and ice, plan to refer back to PCP if no pain improvements over the next few weeks    Goals:   Short Term Goals:   Lumbar flexion and R SB AROM improved by 10%   Compliant with HEP 3x/wk or more to improved self efficacy  Short term goal time span:  2-4 weeks      Long Term Goals:    MARIZOL improved by 18% to indicate functional improvement (64% at eval)  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  Planned interventions for next visit: continue with current treatment.

## 2025-03-05 ENCOUNTER — PHYSICAL THERAPY (OUTPATIENT)
Dept: PHYSICAL THERAPY | Facility: REHABILITATION | Age: 25
End: 2025-03-05
Attending: STUDENT IN AN ORGANIZED HEALTH CARE EDUCATION/TRAINING PROGRAM
Payer: COMMERCIAL

## 2025-03-05 DIAGNOSIS — G89.29 CHRONIC BILATERAL LOW BACK PAIN WITHOUT SCIATICA: ICD-10-CM

## 2025-03-05 DIAGNOSIS — M54.50 CHRONIC BILATERAL LOW BACK PAIN WITHOUT SCIATICA: ICD-10-CM

## 2025-03-05 PROCEDURE — 999999 HB NO CHARGE

## 2025-03-05 PROCEDURE — 97110 THERAPEUTIC EXERCISES: CPT

## 2025-03-05 NOTE — OP THERAPY DAILY TREATMENT
Outpatient Physical Therapy  DAILY TREATMENT     [facility] Physical Therapy [address].  [address]  [place] [address]  Phone:  [phone]  Fax:  [phone]    Date: 2025    Patient: Rick Schafer  YOB: 2000  MRN: [id number]     Time Calculation    Start time: 1430  Stop time: 1530 Time Calculation (min): 60 minutes         Chief Complaint: Back Problem    Visit #: 5    SUBJECTIVE:  Pt reports that since last visit, he does feel a stronger, felt empowered to be able to do squats last visit, is sleeping better with new bed, had instance of leg numbness with 30min standing that went away immediately with sitting    Mechanism of injury:  Pt reports low back pain starting in 2024 after putting snow chains on. 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  Pain:     Current pain ratin    Quality:  Tingling, pressure and radiating    OBJECTIVE:  Current objective measures:     L/S AROM:  Flex: 75% - improved by 10%  Ext: 100%  LLF: 75% with pain - improved to no pain just report of tightness  RLF: 90%     T/S AROM:  LR: 90% - improved to 100%   RR: 90% - improved to 100%      Hip MMT:   Flex: 5/5 bilat  Ext: L 3+/5, R 4+/5 - improved 4+/5 bilat  Abd:4+/5 bilat     Bridge hold: 1min, 10s - improved to 1:30     Hip PROM:  Flex: WFL bilat  Ext: 5deg bilat  Abd: WFL  IR: WFL  ER: 45deg bilat     PA's: hypomobile L3-S1  Tenderness to palpation: very tender R>L lumbar paraspinals, glute max, and piriformis - partial improvement, intensity of tenderness is decreased  Posture: moderate rounded shoulders  Gait: wide SLOAN, hip ER bilat          Therapeutic Exercises (CPT 29932):     1. LTR, NT    2.  Piriformis figure 4 stretch, NT    3. Standing lumbar extensions, NT    4. Prone hip extensions, NT    5. SLR, to fatigue, NT    6. Clamshells, to fatigue, NT    7. Bridges, knees on bolster, 10s holds, to fatigue, NT    8. Supine marches with TrA bracing, to fatigue, NT    10. Ball rollouts, as needed between sets, NT    11. lat walks, BTB around knees 3x10 steps, NT    12. rows/ext, BTB 2x12 ea    13. wall push ups, 2x10    14. paloff press, BTB 8x5s holds eaa, NT    15. Lat pulldowns, 60# to fatigue, NT    16. Step downs 4in, 2x6 ea, NT    17. Squat to bench, 2x10    18. Prone opp arm/leg lifts, 10x5s holds    20. Recumbent bike 5min    Therapeutic Treatments and Modalities:     1. E Stim Attended (CPT 07486), IFC with ice pack to low back prone x15, no charge    Time-based treatments/modalities:    Physical Therapy Timed Treatment Charges  Therapeutic exercise minutes (CPT 00822): 45 minutes      ASSESSMENT:   Pt has completed 5 visits of PT meeting both short term goals and progressing toward long term goals. Demo's improved hip strength and posterior chain endurance. Impairments continue to include pain with standing >25min and lifting anything heavy. The patient will benefit from continued skilled therapy with focus on strength and ROM deficits in order to increase participation with daily tasks. The pt states he understands and agrees to the POC.      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  Planned interventions for next visit:  continue with current treatment.

## 2025-03-06 NOTE — OP THERAPY PROGRESS SUMMARY
Outpatient Physical Therapy  PROGRESS SUMMARY NOTE      [facility] Physical Therapy [place] [address]  [address]  [place] [address]  Phone:  [phone]  Fax:  [phone]    Date of Visit: 03/05/2025    Patient: Rick Schafer  YOB: 2000  MRN: [id number]     Referring Provider: Олег Hayward D.O.  [address]  [address]  [place] [address]   Referring Diagnosis Low back pain, unspecified [M54.50];Other chronic pain [G89.29]     Visit Diagnoses     ICD-10-CM   1. Chronic bilateral low back pain without sciatica  M54.50    G89.29       Rehab Potential: good    Progress Report Period: 2/5/25 - 3/5/25    Functional Assessment Used          Objective Findings and Assessment:   Patient progression towards goals: Progressing, cont PT    Objective findings and assessment details: Pt has completed 5 visits of PT meeting both short term goals and progressing toward long term goals. Demo's improved hip strength and posterior chain endurance. Impairments continue to include pain with standing >25min and lifting anything heavy. The patient will benefit from continued skilled therapy with focus on strength and ROM deficits in order to increase participation with daily tasks. The pt states he understands and agrees to the POC.    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     Certification Period: 03/05/2025 to 05/01/25    Physician Signature: ________________________________ Date: ______________

## 2025-03-13 ENCOUNTER — PHYSICAL THERAPY (OUTPATIENT)
Dept: PHYSICAL THERAPY | Facility: REHABILITATION | Age: 25
End: 2025-03-13
Attending: STUDENT IN AN ORGANIZED HEALTH CARE EDUCATION/TRAINING PROGRAM
Payer: COMMERCIAL

## 2025-03-13 DIAGNOSIS — G89.29 CHRONIC BILATERAL LOW BACK PAIN WITHOUT SCIATICA: ICD-10-CM

## 2025-03-13 DIAGNOSIS — M54.50 CHRONIC BILATERAL LOW BACK PAIN WITHOUT SCIATICA: ICD-10-CM

## 2025-03-13 PROCEDURE — 97110 THERAPEUTIC EXERCISES: CPT

## 2025-03-17 ENCOUNTER — PHYSICAL THERAPY (OUTPATIENT)
Dept: PHYSICAL THERAPY | Facility: REHABILITATION | Age: 25
End: 2025-03-17
Attending: STUDENT IN AN ORGANIZED HEALTH CARE EDUCATION/TRAINING PROGRAM
Payer: COMMERCIAL

## 2025-03-17 DIAGNOSIS — M54.50 CHRONIC BILATERAL LOW BACK PAIN WITHOUT SCIATICA: ICD-10-CM

## 2025-03-17 DIAGNOSIS — G89.29 CHRONIC BILATERAL LOW BACK PAIN WITHOUT SCIATICA: ICD-10-CM

## 2025-03-17 PROCEDURE — 97110 THERAPEUTIC EXERCISES: CPT

## 2025-03-17 NOTE — OP THERAPY DAILY TREATMENT
Outpatient Physical Therapy  DAILY TREATMENT     [facility] Physical Therapy [address].  [address]  [place] [address]  Phone:  [phone]  Fax:  [phone]    Date: 2025    Patient: Rick Schafer  YOB: 2000  MRN: [id number]     Time Calculation    Start time: 0900  Stop time: 0930 Time Calculation (min): 30 minutes         Chief Complaint: Back Problem    Visit #: 7    SUBJECTIVE:  Pt reports he is getting over a cold from this week so has been more inactive.     Mechanism of injury:  Pt reports low back pain starting in 2024 after putting snow chains on. 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  Pain:     Current pain ratin    Quality:  Tingling, pressure and radiating    OBJECTIVE:  Current objective measures:     L/S AROM:  Flex: 75% - improved by 10%  Ext: 100%  LLF: 75% with pain - improved to no pain just report of tightness  RLF: 90%     T/S AROM:  LR: 90% - improved to 100%   RR: 90% - improved to 100%      Hip MMT:   Flex: 5/5 bilat  Ext: L 3+/5, R 4+/5 - improved 4+/5 bilat  Abd:4+/5 bilat     Bridge hold: 1min, 10s - improved to 1:30     Hip PROM:  Flex: WFL bilat  Ext: 5deg bilat  Abd: WFL  IR: WFL  ER: 45deg bilat     PA's: hypomobile L3-S1  Tenderness to palpation: very tender R>L lumbar paraspinals, glute max, and piriformis - partial improvement, intensity of tenderness is decreased  Posture: moderate rounded shoulders  Gait: wide SLOAN, hip ER bilat          Therapeutic Exercises (CPT 28892):     5. SLR, to fatigue, NT    6. Clamshells, to fatigue, NT    7. Bridges, knees on bolster, 10s holds, to fatigue, NT    8. Supine marches with TrA bracing, to fatigue,  NT    10. Ball rollouts, as needed between sets, NT    11. lat walks, BTB around knees 3x10 steps, NT    12. rows/ext, BTB 2x12 ea, NT    13. wall push ups, 2x10, NT    14. paloff press, doubled GTB 2x8 ea    15. Lat pulldowns, 40-50# to fatigue, regressed from 60# 2/2 getting over cold    16. Step downs 4in, 2x6 ea, NT    17. Squat to bench, 3x10, BW today, regressed from 8# 2/2 getting over cold    18. Prone opp arm/leg lifts, 10x5s holds, NT    19. Shuttle leg press, 6C DL for warm up, then same SL to fatigue x 3 ea, NT    20. Recumbent bike 5min    Therapeutic Treatments and Modalities:     1. E Stim Attended (CPT 55692), IFC with ice pack to low back prone x15, not today    Time-based treatments/modalities:    Physical Therapy Timed Treatment Charges  Therapeutic exercise minutes (CPT 31958): 30 minutes      ASSESSMENT:   Pt opted to cut treatment short due to feeling unwell, although he tolerated regressed load of similar therex program at lower reps or sets with inc rest breaks as needed      Goals:   Short Term Goals:   Lumbar flexion and R SB AROM improved by 10%  - met  Compliant with HEP 3x/wk or more to improved self efficacy - met  Short term goal time span:  2-4 weeks    NEW    Pt will be able to squat holding 15#      Long Term Goals:    MARIZOL improved by 18% to indicate functional improvement (64% at eval) - DNT continue  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  Planned interventions for next visit: continue with current treatment.

## 2025-03-25 ENCOUNTER — APPOINTMENT (OUTPATIENT)
Dept: PHYSICAL THERAPY | Facility: REHABILITATION | Age: 25
End: 2025-03-25
Attending: STUDENT IN AN ORGANIZED HEALTH CARE EDUCATION/TRAINING PROGRAM
Payer: COMMERCIAL

## 2025-03-26 ENCOUNTER — PHYSICAL THERAPY (OUTPATIENT)
Dept: PHYSICAL THERAPY | Facility: REHABILITATION | Age: 25
End: 2025-03-26
Attending: STUDENT IN AN ORGANIZED HEALTH CARE EDUCATION/TRAINING PROGRAM
Payer: COMMERCIAL

## 2025-03-26 DIAGNOSIS — M54.50 CHRONIC BILATERAL LOW BACK PAIN WITHOUT SCIATICA: ICD-10-CM

## 2025-03-26 DIAGNOSIS — G89.29 CHRONIC BILATERAL LOW BACK PAIN WITHOUT SCIATICA: ICD-10-CM

## 2025-03-26 PROCEDURE — 97140 MANUAL THERAPY 1/> REGIONS: CPT

## 2025-03-26 PROCEDURE — 999999 HB NO CHARGE

## 2025-03-26 PROCEDURE — 97110 THERAPEUTIC EXERCISES: CPT

## 2025-03-26 NOTE — OP THERAPY DAILY TREATMENT
Outpatient Physical Therapy  DAILY TREATMENT     [facility] Physical Therapy [address].  [address]  [place] [address]  Phone:  [phone]  Fax:  [phone]    Date: 2025    Patient: Rick Schafer  YOB: 2000  MRN: [id number]     Time Calculation    Start time: 1320  Stop time: 1425 Time Calculation (min): 65 minutes         Chief Complaint: Back Problem    Visit #: 8    SUBJECTIVE:  No changes reported since last visit, has follow up next week with pain management    Mechanism of injury:  Pt reports low back pain starting in 2024 after putting snow chains on. 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  Pain:     Current pain ratin    Quality:  Tingling, pressure and radiating    OBJECTIVE:  Current objective measures:     L/S AROM:  Flex: 75% - improved by 10%  Ext: 100%  LLF: 75% with pain - improved to no pain just report of tightness  RLF: 90%     T/S AROM:  LR: 90% - improved to 100%   RR: 90% - improved to 100%      Hip MMT:   Flex: 5/5 bilat  Ext: L 3+/5, R 4+/5 - improved 4+/5 bilat  Abd:4+/5 bilat     Bridge hold: 1min, 10s - improved to 1:30     Hip PROM:  Flex: WFL bilat  Ext: 5deg bilat  Abd: WFL  IR: WFL  ER: 45deg bilat     PA's: hypomobile L3-S1  Tenderness to palpation: very tender R>L lumbar paraspinals, glute max, and piriformis - partial improvement, intensity of tenderness is decreased  Posture: moderate rounded shoulders  Gait: wide SLOAN, hip ER bilat          Therapeutic Exercises (CPT 55835):     4. Ball LTR, 5min    5. SLR, to fatigue, NT    6. Clamshells, to fatigue, NT    7. Ball bridges, 10s holds, to fatigue    8. Supine marches with TrA bracing, to  fatigue, NT    10. Ball rollouts, as needed between sets, NT    11. lat walks, BTB around knees 3x10 steps, NT    12. rows/ext, BTB 2x12 ea, NT    13. wall push ups, 2x10, NT    14. paloff press, doubled GTB 2x8 ea, NT    15. Lat pulldowns, 40-50# to fatigue, NT    16. Step downs 4in, 2x6 ea, NT    17. Squat to bench, 3x10, NT    18. Prone opp arm/leg lifts, 10x5s holds, NT    19. Shuttle leg press, 6C DL for warm up, then same SL to fatigue x 3 ea, NT    20. Recumbent bike 5min, NT    Therapeutic Treatments and Modalities:     1. E Stim Attended (CPT 27115), IFC with ice pack to low back prone x15, not today    2. Mechanical Traction (CPT 38951), 15min, no charge    3. Manual Therapy (CPT 60748), see below    Therapeutic Treatment and Modalities Summary: Manual: lumbar PA mobs central and unilateral, STM lumbar paraspinals, MET to pelvis for posterior rotation    Time-based treatments/modalities:    Physical Therapy Timed Treatment Charges  Manual therapy minutes (CPT 67377): 30 minutes  Therapeutic exercise minutes (CPT 05521): 15 minutes      ASSESSMENT:   Trial of increased manual techniques and traction for pain relief today as pain has been unchanged, pt reported moderate relief following manual techniques and inconclusive response to mechanical traction, will review 48hr response to treatments at next visit      Goals:   Short Term Goals:   Lumbar flexion and R SB AROM improved by 10%  - met  Compliant with HEP 3x/wk or more to improved self efficacy - met  Short term goal time span:  2-4 weeks    NEW    Pt will be able to squat holding 15#      Long Term Goals:    MARIZOL improved by 18% to indicate functional improvement (64% at eval) - DNT continue  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  Planned interventions for next visit: continue with current treatment.

## 2025-03-27 ENCOUNTER — APPOINTMENT (OUTPATIENT)
Dept: PHYSICAL THERAPY | Facility: REHABILITATION | Age: 25
End: 2025-03-27
Attending: STUDENT IN AN ORGANIZED HEALTH CARE EDUCATION/TRAINING PROGRAM
Payer: COMMERCIAL

## 2025-03-31 ENCOUNTER — HOSPITAL ENCOUNTER (OUTPATIENT)
Facility: MEDICAL CENTER | Age: 25
End: 2025-03-31
Attending: FAMILY MEDICINE
Payer: COMMERCIAL

## 2025-03-31 LAB
25(OH)D3 SERPL-MCNC: 35 NG/ML (ref 30–100)
BASOPHILS # BLD AUTO: 0.4 % (ref 0–1.8)
BASOPHILS # BLD: 0.05 K/UL (ref 0–0.12)
EOSINOPHIL # BLD AUTO: 0.01 K/UL (ref 0–0.51)
EOSINOPHIL NFR BLD: 0.1 % (ref 0–6.9)
ERYTHROCYTE [DISTWIDTH] IN BLOOD BY AUTOMATED COUNT: 42.2 FL (ref 35.9–50)
HCT VFR BLD AUTO: 50.3 % (ref 37–47)
HGB BLD-MCNC: 16.9 G/DL (ref 12–16)
IMM GRANULOCYTES # BLD AUTO: 0.06 K/UL (ref 0–0.11)
IMM GRANULOCYTES NFR BLD AUTO: 0.5 % (ref 0–0.9)
LYMPHOCYTES # BLD AUTO: 1.55 K/UL (ref 1–4.8)
LYMPHOCYTES NFR BLD: 13.4 % (ref 22–41)
MCH RBC QN AUTO: 29.9 PG (ref 27–33)
MCHC RBC AUTO-ENTMCNC: 33.6 G/DL (ref 32.2–35.5)
MCV RBC AUTO: 89 FL (ref 81.4–97.8)
MONOCYTES # BLD AUTO: 0.4 K/UL (ref 0–0.85)
MONOCYTES NFR BLD AUTO: 3.4 % (ref 0–13.4)
NEUTROPHILS # BLD AUTO: 9.53 K/UL (ref 1.82–7.42)
NEUTROPHILS NFR BLD: 82.2 % (ref 44–72)
NRBC # BLD AUTO: 0 K/UL
NRBC BLD-RTO: 0 /100 WBC (ref 0–0.2)
PLATELET # BLD AUTO: 364 K/UL (ref 164–446)
PMV BLD AUTO: 9.4 FL (ref 9–12.9)
RBC # BLD AUTO: 5.65 M/UL (ref 4.2–5.4)
TESTOST SERPL-MCNC: 432 NG/DL (ref 9–75)
WBC # BLD AUTO: 11.6 K/UL (ref 4.8–10.8)

## 2025-03-31 PROCEDURE — 82670 ASSAY OF TOTAL ESTRADIOL: CPT

## 2025-03-31 PROCEDURE — 36415 COLL VENOUS BLD VENIPUNCTURE: CPT

## 2025-03-31 PROCEDURE — 85025 COMPLETE CBC W/AUTO DIFF WBC: CPT

## 2025-03-31 PROCEDURE — 82306 VITAMIN D 25 HYDROXY: CPT

## 2025-03-31 PROCEDURE — 84403 ASSAY OF TOTAL TESTOSTERONE: CPT

## 2025-04-01 ENCOUNTER — APPOINTMENT (OUTPATIENT)
Dept: PHYSICAL MEDICINE AND REHAB | Facility: MEDICAL CENTER | Age: 25
End: 2025-04-01
Payer: COMMERCIAL

## 2025-04-01 VITALS
RESPIRATION RATE: 18 BRPM | WEIGHT: 293 LBS | HEIGHT: 70 IN | SYSTOLIC BLOOD PRESSURE: 128 MMHG | TEMPERATURE: 97.2 F | OXYGEN SATURATION: 99 % | HEART RATE: 87 BPM | DIASTOLIC BLOOD PRESSURE: 84 MMHG | BODY MASS INDEX: 41.95 KG/M2

## 2025-04-01 DIAGNOSIS — E66.01 SEVERE OBESITY (BMI >= 40) (HCC): ICD-10-CM

## 2025-04-01 DIAGNOSIS — M54.50 CHRONIC BILATERAL LOW BACK PAIN WITHOUT SCIATICA: ICD-10-CM

## 2025-04-01 DIAGNOSIS — G89.29 CHRONIC BILATERAL LOW BACK PAIN WITHOUT SCIATICA: ICD-10-CM

## 2025-04-01 PROCEDURE — 1125F AMNT PAIN NOTED PAIN PRSNT: CPT | Performed by: STUDENT IN AN ORGANIZED HEALTH CARE EDUCATION/TRAINING PROGRAM

## 2025-04-01 PROCEDURE — 3079F DIAST BP 80-89 MM HG: CPT | Performed by: STUDENT IN AN ORGANIZED HEALTH CARE EDUCATION/TRAINING PROGRAM

## 2025-04-01 PROCEDURE — 3074F SYST BP LT 130 MM HG: CPT | Performed by: STUDENT IN AN ORGANIZED HEALTH CARE EDUCATION/TRAINING PROGRAM

## 2025-04-01 PROCEDURE — 99214 OFFICE O/P EST MOD 30 MIN: CPT | Performed by: STUDENT IN AN ORGANIZED HEALTH CARE EDUCATION/TRAINING PROGRAM

## 2025-04-01 RX ORDER — GABAPENTIN 100 MG/1
100-300 CAPSULE ORAL NIGHTLY PRN
Qty: 90 CAPSULE | Refills: 3 | Status: SHIPPED | OUTPATIENT
Start: 2025-04-01

## 2025-04-01 ASSESSMENT — PATIENT HEALTH QUESTIONNAIRE - PHQ9
SUM OF ALL RESPONSES TO PHQ QUESTIONS 1-9: 10
5. POOR APPETITE OR OVEREATING: 1 - SEVERAL DAYS
CLINICAL INTERPRETATION OF PHQ2 SCORE: 4

## 2025-04-01 ASSESSMENT — PAIN SCALES - GENERAL: PAINLEVEL_OUTOF10: 8=MODERATE-SEVERE PAIN

## 2025-04-01 ASSESSMENT — FIBROSIS 4 INDEX: FIB4 SCORE: 0.25

## 2025-04-01 NOTE — PROGRESS NOTES
[facility] (Physical Medicine and Rehabilitation)  Sports Medicine& Interventional Spine   Follow Up Patient Visit      Chief complaint:   Chief Complaint   Patient presents with    Follow-Up     [facility]          HISTORY        HPI  Patient identification: Jessica Schafer ,  2000,   With Diagnoses of Chronic bilateral low back pain without sciatica and Severe obesity (BMI >= 40) ([facility]) were pertinent to this visit.         At last visit dated 25  Rick Schafer  2000 person who presents today for repeat evaluation for chronic low back pain.  Pain is currently located in the axial spine without radiation to bilateral lower extremities.  He does note mild improvement in pain since previous visit and is able to tolerate standing for long period time.  Notes minimal change in pain with recent addition of tizanidine as muscle relaxer.  Has been continuing to take meloxicam as well.  MRI of the lumbar spine was ordered for further evaluation.  This showed it was an otherwise unremarkable study except for a small central disc protrusion at L5-S1 however this is unlikely the source of patient's current symptomology.  Discussed continued back health strategies including daily activity, strengthening and stretching, physical therapy which is already been ordered, and weight loss.  Patient is understanding.  Recommend patient follow-up in 3 months     Interval hx:  History of Present Illness  The patient is a 24-year-old male who presents today for repeat evaluation of chronic low back pain.    He reports that his back pain remains unchanged, with a severity of 7 to 8 out of 10. The pain is located throughout the lumbar and thoracolumbar spine without radiation to the lower extremities. He has difficulty bending, walking, or standing for long periods of time. He can stand for approximately 20 to 30 minutes during class if he rotates his pelvis to decompress it, but this requires him to sit  down shortly after. He has been undergoing physical therapy once a week and reports minimal improvement. His physical therapist has inquired about the frequency of his sessions, whether they should continue once a week or increase to twice a week, and for how long. He has four sessions remaining. He has tried Tylenol, ibuprofen, and prescribed medications, including a muscle relaxer, but none have provided relief.    MEDICATIONS  Tylenol, ibuprofen           ROS Red Flags :   Fever, Chills, Sweats: Denies  Involuntary Weight Loss: Denies  Bowel/Bladder Incontinence: Denies  Saddle Anesthesia: Denies        PMHx:   Past Medical History:   Diagnosis Date    Asthma     Mass of left wrist 05/01/2024    Surgical intervention    NSVT (nonsustained ventricular tachycardia) (HCC)        PSHx:   Past Surgical History:   Procedure Laterality Date    PB EXC TUMOR SOFT TISS FOREARM&/WRIST SUBFAS* Left 7/23/2024    Procedure: LEFT WRIST MASS EXCISION;  Surgeon: Juarez Ordonez M.D.;  Location: [facility];  Service: Orthopedics       Family history   Family History   Problem Relation Age of Onset    Hypertension Mother     Thyroid Mother         hypothyroid    Hypertension Father     Depression Father     Anxiety disorder Father     Bipolar disorder Father     Depression Sister     Other Sister         PCOS    Depression Brother     Other Maternal Grandmother         AML    Diabetes Maternal Grandfather     Cancer Maternal Grandfather         pancreatic    Cancer Paternal Grandmother         skin    Lung Disease Paternal Grandmother         COPD, [facility]         Medications:   Outpatient Medications Marked as Taking for the 4/1/25 encounter (Office Visit) with Олег Hayward D.O.   Medication Sig Dispense Refill    gabapentin (NEURONTIN) 100 MG Cap Take 1-3 Capsules by mouth at bedtime as needed (pain). 90 Capsule 3    albuterol 108 (90 Base) MCG/ACT Aero Soln inhalation aerosol Inhale 2 Puffs every 6 hours as  "needed for Shortness of Breath. 8.5 g 0    D3-50 1.25 MG (52136 UT) Cap TAKE 1 CAPSULE BY MOUTH ONE DAY A WEEK FOR 8 WEEKS      ondansetron (ZOFRAN) 4 MG Tab tablet Take 1 Tablet by mouth every four hours as needed for Nausea/Vomiting. 20 Tablet 11    tizanidine (ZANAFLEX) 4 MG Tab Take 1 Tablet by mouth at bedtime as needed (muscle spasms). 30 Tablet 2    meloxicam (MOBIC) 15 MG tablet Take 1 Tablet by mouth every day. 90 Tablet 3    Semaglutide (WEGOVY) 0.5 MG/0.5ML Solution Auto-injector Pen-injector Inject 0.5 mL under the skin every 7 days. 2 mL 1    testosterone cypionate (DEPO-TESTOSTERONE) 200 MG/ML injection Inject 50 mg into the shoulder, thigh, or buttocks one time.      BD SYRINGE SLIP TIP 25G X 5/8\" 1 ML Misc USE AS DIRECTED TO INJECT MEDICATION SUBCUTANEOUSLY      CIMZIA, 2 SYRINGE, 200 MG/ML Prefilled Syringe Kit       albuterol 108 (90 Base) MCG/ACT Aero Soln inhalation aerosol albuterol sulfate HFA 90 mcg/actuation aerosol inhaler   Inhale 2 puffs every 4 hours by inhalation route as needed.   Take as needed for shortness of breath and/or wheezing; take before exercise          Current Outpatient Medications on File Prior to Visit   Medication Sig Dispense Refill    albuterol 108 (90 Base) MCG/ACT Aero Soln inhalation aerosol Inhale 2 Puffs every 6 hours as needed for Shortness of Breath. 8.5 g 0    D3-50 1.25 MG (33324 UT) Cap TAKE 1 CAPSULE BY MOUTH ONE DAY A WEEK FOR 8 WEEKS      ondansetron (ZOFRAN) 4 MG Tab tablet Take 1 Tablet by mouth every four hours as needed for Nausea/Vomiting. 20 Tablet 11    tizanidine (ZANAFLEX) 4 MG Tab Take 1 Tablet by mouth at bedtime as needed (muscle spasms). 30 Tablet 2    meloxicam (MOBIC) 15 MG tablet Take 1 Tablet by mouth every day. 90 Tablet 3    Semaglutide (WEGOVY) 0.5 MG/0.5ML Solution Auto-injector Pen-injector Inject 0.5 mL under the skin every 7 days. 2 mL 1    testosterone cypionate (DEPO-TESTOSTERONE) 200 MG/ML injection Inject 50 mg into the " "shoulder, thigh, or buttocks one time.      BD SYRINGE SLIP TIP 25G X 5/8\" 1 ML Misc USE AS DIRECTED TO INJECT MEDICATION SUBCUTANEOUSLY      CIMZIA, 2 SYRINGE, 200 MG/ML Prefilled Syringe Kit       albuterol 108 (90 Base) MCG/ACT Aero Soln inhalation aerosol albuterol sulfate HFA 90 mcg/actuation aerosol inhaler   Inhale 2 puffs every 4 hours by inhalation route as needed.   Take as needed for shortness of breath and/or wheezing; take before exercise       No current facility-administered medications on file prior to visit.         Allergies:   No Known Allergies    Social Hx:   Social History     Socioeconomic History    Marital status: Single     Spouse name: Not on file    Number of children: Not on file    Years of education: Not on file    Highest education level: Not on file   Occupational History    Not on file   Tobacco Use    Smoking status: Never    Smokeless tobacco: Never   Vaping Use    Vaping status: Former   Substance and Sexual Activity    Alcohol use: Not Currently     Comment: OCC    Drug use: Never    Sexual activity: Yes     Partners: Male, Female   Other Topics Concern    Not on file   Social History Narrative    Not on file     Social Drivers of Health     Financial Resource Strain: Not on file   Food Insecurity: Not on file   Transportation Needs: Not on file   Physical Activity: Not on file   Stress: Not on file   Social Connections: Not on file   Intimate Partner Violence: Not on file   Housing Stability: Not on file         EXAMINATION     Physical Exam:   Vitals: /84 (BP Location: Left arm, Patient Position: Sitting, BP Cuff Size: Adult)   Pulse 87   Temp 36.2 °C (97.2 °F) (Temporal)   Resp 18   Ht 1.778 m (5' 10\")   Wt (!) 185 kg (407 lb)   SpO2 99%     Constitutional:   Body Habitus: Body mass index is 58.4 kg/m².  Cooperation: Fully cooperates with exam  Appearance: Well-groomed no disheveled    Respiratory-  breathing comfortable on room air, no audible " wheezing  Cardiovascular- capillary refills less than 2 seconds. No lower extremity edema is noted.   Psychiatric- alert and oriented ×3. Normal affect.    MSK and Neuro: -    Thoracic/Lumbar Spine/Sacral Spine/Hips   Inspection: No evidence of atrophy in bilateral lower extremities throughout   ROM: full  AROM with flexion, extension, lateral flexion, and rotation bilaterally, without pain   Palpation:   TTP at midline and paraspinal musculature and area of fold in skin at the level of L2-4.   palpation over SI joint: negative bilaterally    palpation over buttock: negative bilaterally    palpation in hip or over the greater trochanters: Which my mom never cut my hairnegative bilaterally       Lumbar spine Special tests  Neuro tension  Straight leg test negative bilaterally    Slump test right I had really curly wavy hairnegative bilaterally       HIP  FAIR test negative bilaterally    Range of motion in the hips is within normal limits in flexion, extension, abduction, internal rotation, external rotation.     SI joint tests  Observation patient sits on one buttocks: Negative  SI joint compression negative bilaterally    SI joint distraction yeahnegative bilaterally    Thigh thrust test negative bilaterally    DELONTE test negative bilaterally      Neuro   Key points for the international standards for neurological classification of spinal cord injury (ISNCSCI) to light touch.   Dermatome R L   L2 2 2   L3 2 2   L4 2 2   L5 2 2   S1 2 2   S2 2 2      Motor Exam Lower Extremities  ? Myotome R L   Hip flexion L2  5 5   Knee extension L3 5 5   Ankle dorsiflexion L4 5 5   Toe extension L5 5 5   Ankle plantarflexion S1 5 5         Babinski sign neg bilaterally  Clonus of the ankle negative bilaterally          MEDICAL DECISION MAKING    DATA    Labs:   Lab Results   Component Value Date/Time    SODIUM 139 10/29/2024 02:30 PM    POTASSIUM 3.7 10/29/2024 02:30 PM    CHLORIDE 102 10/29/2024 02:30 PM    CO2 24 10/29/2024  "02:30 PM    GLUCOSE 89 10/29/2024 02:30 PM    BUN 15 10/29/2024 02:30 PM    CREATININE 0.76 10/29/2024 02:30 PM        No results found for: \"PROTHROMBTM\", \"INR\"     Lab Results   Component Value Date/Time    WBC 11.6 (H) 03/31/2025 08:49 AM    RBC 5.65 (H) 03/31/2025 08:49 AM    HEMOGLOBIN 16.9 (H) 03/31/2025 08:49 AM    HEMATOCRIT 50.3 (H) 03/31/2025 08:49 AM    MCV 89.0 03/31/2025 08:49 AM    MCH 29.9 03/31/2025 08:49 AM    MCHC 33.6 03/31/2025 08:49 AM    MPV 9.4 03/31/2025 08:49 AM    NEUTSPOLYS 82.20 (H) 03/31/2025 08:49 AM    LYMPHOCYTES 13.40 (L) 03/31/2025 08:49 AM    MONOCYTES 3.40 03/31/2025 08:49 AM    EOSINOPHILS 0.10 03/31/2025 08:49 AM    BASOPHILS 0.40 03/31/2025 08:49 AM        Lab Results   Component Value Date/Time    HBA1C 4.9 10/29/2024 02:30 PM          Imaging:   I personally reviewed following images      MRI of the lumbar spine dated 1/21/2025 shows normal alignment and vertebral height, no obvious acute fractures.  Small central disc protrusion at L5-S1 without canal or neuroforaminal stenosis         I reviewed the following radiology reports                         Results for orders placed in visit on 01/21/25    MR-LUMBAR SPINE-W/O    Impression  Unremarkable noncontrast MR examination of the lumbar spine except for small central disc protrusion at L5-S1 without causing spinal or neural foraminal stenosis..        Results for orders placed in visit on 01/21/25    MR-LUMBAR SPINE-W/O    Impression  Unremarkable noncontrast MR examination of the lumbar spine except for small central disc protrusion at L5-S1 without causing spinal or neural foraminal stenosis..                                                                                        Results for orders placed during the hospital encounter of 05/19/23    DX-ANKLE 3+ VIEWS LEFT    Impression  No radiographic evidence of enthesopathy, degenerative or inflammatory arthropathy    Mild soft tissue swelling    [place] postoperative " change                       Results for orders placed in visit on 11/15/22    DX-KNEE 3 VIEWS RIGHT    Impression  1. No acute osseous abnormality.    2. Unremarkable weightbearing view of the bilateral knees with no malalignment.   Results for orders placed in visit on 11/15/22    DX-KNEES-AP BILATERAL STANDING    Impression  1. No acute osseous abnormality.    2. Unremarkable weightbearing view of the bilateral knees with no malalignment.   Results for orders placed in visit on 09/30/24    DX-KNEE COMPLETE 4+ LEFT   Results for orders placed in visit on 12/05/24    DX-LUMBAR SPINE-2 OR 3 VIEWS    Impression  Mild levoscoliosis. Otherwise, normal.          Results for orders placed during the hospital encounter of 05/19/23    DX-SACROILIAC JOINTS 3+    Impression  No radiographic evidence of degenerative or inflammatory arthropathy          Results for orders placed in visit on 05/01/24    DX-WRIST-COMPLETE 3+ LEFT         DIAGNOSIS   Visit Diagnoses     ICD-10-CM   1. Chronic bilateral low back pain without sciatica  M54.50    G89.29   2. Severe obesity (BMI >= 40) ([facility])  E66.01           ASSESSMENT and PLAN:      Assessment & Plan  1. Chronic low back pain.  The patient's chronic low back pain is not indicative of any severe neurological or structural complications, as evidenced by both the physical examination and imaging studies (x-ray and MRI). The primary concern is to achieve effective pain control.  He has been informed about the potential benefits of anticonvulsant medications such as gabapentin and antidepressants like duloxetine (Cymbalta) in modulating pain. A prescription for gabapentin 100 mg has been issued, with instructions to take one capsule at night for a few days before increasing the dosage to three capsules. He has been advised to continue with physical therapy sessions twice a week for the next few weeks. Can consider duloxetine at following    Follow-up  The patient will follow up in 3 to  4 weeks.     Rick was seen today for follow-up.    Diagnoses and all orders for this visit:    Chronic bilateral low back pain without sciatica    Severe obesity (BMI >= 40) (HCC)    Other orders  -     gabapentin (NEURONTIN) 100 MG Cap; Take 1-3 Capsules by mouth at bedtime as needed (pain).              Follow up: 4 weeks    I spent a total of 37 minutes on this visit including reviewing previous notes and imaging, in-room history and physical exam of the patient, counseling the patient on likely diagnosis and plan, ordering diagnostic tests, adjusting medications, and documenting the findings in the note.This is excluding procedures      Please note that this dictation was created using voice recognition software. I have made every reasonable attempt to correct obvious errors but there may be errors of grammar and content that I may have overlooked prior to finalization of this note.    Олег Hayward DO  Physical Medicine and Rehabilitation  Sports Medicine and Spine  [facility]

## 2025-04-02 ENCOUNTER — APPOINTMENT (OUTPATIENT)
Dept: PHYSICAL THERAPY | Facility: REHABILITATION | Age: 25
End: 2025-04-02
Attending: STUDENT IN AN ORGANIZED HEALTH CARE EDUCATION/TRAINING PROGRAM
Payer: COMMERCIAL

## 2025-04-03 LAB — ESTRADIOL SERPL HS-MCNC: 52.3 PG/ML

## 2025-04-09 ENCOUNTER — PHYSICAL THERAPY (OUTPATIENT)
Dept: PHYSICAL THERAPY | Facility: REHABILITATION | Age: 25
End: 2025-04-09
Attending: STUDENT IN AN ORGANIZED HEALTH CARE EDUCATION/TRAINING PROGRAM
Payer: COMMERCIAL

## 2025-04-09 DIAGNOSIS — G89.29 CHRONIC BILATERAL LOW BACK PAIN WITHOUT SCIATICA: ICD-10-CM

## 2025-04-09 DIAGNOSIS — M54.50 CHRONIC BILATERAL LOW BACK PAIN WITHOUT SCIATICA: ICD-10-CM

## 2025-04-09 PROCEDURE — 97110 THERAPEUTIC EXERCISES: CPT

## 2025-04-09 NOTE — OP THERAPY DAILY TREATMENT
Outpatient Physical Therapy  DAILY TREATMENT     [facility] Physical Therapy [address]  [address]  [place] [address]  Phone:  [phone]  Fax:  [phone]    Date: 2025    Patient: Rick Schafer  YOB: 2000  MRN: [id number]     Time Calculation    Start time: 1445  Stop time: 1515 Time Calculation (min): 30 minutes         Chief Complaint: Back Problem    Visit #: 9    SUBJECTIVE:  Pain management wants to try PT 2x/wk, starting gabapentin then possibly antidepressants before trying injections    Mechanism of injury:  Pt reports low back pain starting in 2024 after putting snow chains on. 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  Pain:     Current pain ratin    Quality:  Tingling, pressure and radiating    OBJECTIVE:  Current objective measures:     L/S AROM:  Flex: 75% - improved by 10%  Ext: 100%  LLF: 75% with pain - improved to no pain just report of tightness  RLF: 90%     T/S AROM:  LR: 90% - improved to 100%   RR: 90% - improved to 100%      Hip MMT:   Flex: 5/5 bilat  Ext: L 3+/5, R 4+/5 - improved 4+/5 bilat  Abd:4+/5 bilat     Bridge hold: 1min, 10s - improved to 1:30     Hip PROM:  Flex: WFL bilat  Ext: 5deg bilat  Abd: WFL  IR: WFL  ER: 45deg bilat     PA's: hypomobile L3-S1  Tenderness to palpation: very tender R>L lumbar paraspinals, glute max, and piriformis - partial improvement, intensity of tenderness is decreased  Posture: moderate rounded shoulders  Gait: wide SLOAN, hip ER bilat          Therapeutic Exercises (CPT 26577):     4. Ball LTR, 5min, NT    5. SLR, to fatigue, NT    6. Clamshells, to fatigue, NT    7. Ball bridges, 10s holds, to fatigue, NT    8.  Supine marches with TrA bracing, to fatigue, NT    10. Ball rollouts, as needed between sets, NT    11. lat walks, BTB around knees 3x10 steps, NT    12. rows/ext, BTB 2x12 ea, NT    13. wall push ups, 2x10, NT    14. paloff press, doubled GTB 2x8 ea, NT    15. Lat pulldowns, 40-50# to fatigue, NT    16. Step downs 4in, 2x6 ea, NT    17. Squat to bench, 3x10, 10#    18. Prone opp arm/leg lifts, 5x5s holds, 3 sets ea    19. Shuttle leg press, 6C DL for warm up, then same SL to fatigue x 3 ea    20. Recumbent bike 5min, NT    Therapeutic Treatments and Modalities:     Therapeutic Treatment and Modalities Summary: Manual: lumbar PA mobs central and unilateral, STM lumbar paraspinals, MET to pelvis for posterior rotation    Time-based treatments/modalities:    Physical Therapy Timed Treatment Charges  Therapeutic exercise minutes (CPT 56729): 30 minutes      ASSESSMENT:   Progressed squat load to 10# and SL load on shuttle, therex moderately tolerated with  no significant increase in baseline symptoms, plan to progress reps, sets, or load as tolerated      Goals:   Short Term Goals:   Lumbar flexion and R SB AROM improved by 10%  - met  Compliant with HEP 3x/wk or more to improved self efficacy - met  Short term goal time span:  2-4 weeks    NEW    Pt will be able to squat holding 15#      Long Term Goals:    MARIZOL improved by 18% to indicate functional improvement (64% at eval) - DNT continue  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  Planned interventions for next visit: continue with current treatment.

## 2025-04-15 ENCOUNTER — HOSPITAL ENCOUNTER (OUTPATIENT)
Facility: MEDICAL CENTER | Age: 25
End: 2025-04-15
Attending: FAMILY MEDICINE
Payer: COMMERCIAL

## 2025-04-15 ENCOUNTER — OFFICE VISIT (OUTPATIENT)
Dept: MEDICAL GROUP | Facility: MEDICAL CENTER | Age: 25
End: 2025-04-15
Payer: COMMERCIAL

## 2025-04-15 VITALS
BODY MASS INDEX: 41.95 KG/M2 | HEIGHT: 70 IN | TEMPERATURE: 98.9 F | SYSTOLIC BLOOD PRESSURE: 130 MMHG | DIASTOLIC BLOOD PRESSURE: 78 MMHG | WEIGHT: 293 LBS | HEART RATE: 92 BPM | OXYGEN SATURATION: 97 %

## 2025-04-15 DIAGNOSIS — R52 BODY ACHES: ICD-10-CM

## 2025-04-15 DIAGNOSIS — R23.2 HOT FLASHES: ICD-10-CM

## 2025-04-15 DIAGNOSIS — R11.0 NAUSEA: ICD-10-CM

## 2025-04-15 DIAGNOSIS — R53.83 FATIGUE, UNSPECIFIED TYPE: ICD-10-CM

## 2025-04-15 LAB
ALBUMIN SERPL BCP-MCNC: 4.2 G/DL (ref 3.2–4.9)
ALBUMIN/GLOB SERPL: 1.2 G/DL
ALP SERPL-CCNC: 83 U/L (ref 30–99)
ALT SERPL-CCNC: 35 U/L (ref 2–50)
ANION GAP SERPL CALC-SCNC: 12 MMOL/L (ref 7–16)
AST SERPL-CCNC: 27 U/L (ref 12–45)
BILIRUB SERPL-MCNC: 0.5 MG/DL (ref 0.1–1.5)
BUN SERPL-MCNC: 14 MG/DL (ref 8–22)
CALCIUM ALBUM COR SERPL-MCNC: 9.3 MG/DL (ref 8.5–10.5)
CALCIUM SERPL-MCNC: 9.5 MG/DL (ref 8.5–10.5)
CHLORIDE SERPL-SCNC: 101 MMOL/L (ref 96–112)
CO2 SERPL-SCNC: 23 MMOL/L (ref 20–33)
CREAT SERPL-MCNC: 0.91 MG/DL (ref 0.5–1.4)
CRP SERPL HS-MCNC: 2.22 MG/DL (ref 0–0.75)
ERYTHROCYTE [DISTWIDTH] IN BLOOD BY AUTOMATED COUNT: 42 FL (ref 35.9–50)
ERYTHROCYTE [SEDIMENTATION RATE] IN BLOOD BY WESTERGREN METHOD: 10 MM/HOUR (ref 0–25)
GFR SERPLBLD CREATININE-BSD FMLA CKD-EPI: 91 ML/MIN/1.73 M 2
GLOBULIN SER CALC-MCNC: 3.4 G/DL (ref 1.9–3.5)
GLUCOSE SERPL-MCNC: 78 MG/DL (ref 65–99)
HCT VFR BLD AUTO: 48.5 % (ref 37–47)
HGB BLD-MCNC: 16.4 G/DL (ref 12–16)
IRON SATN MFR SERPL: 18 % (ref 15–55)
IRON SERPL-MCNC: 56 UG/DL (ref 40–170)
MCH RBC QN AUTO: 29.9 PG (ref 27–33)
MCHC RBC AUTO-ENTMCNC: 33.8 G/DL (ref 32.2–35.5)
MCV RBC AUTO: 88.5 FL (ref 81.4–97.8)
PLATELET # BLD AUTO: 365 K/UL (ref 164–446)
PMV BLD AUTO: 9.5 FL (ref 9–12.9)
POTASSIUM SERPL-SCNC: 4 MMOL/L (ref 3.6–5.5)
PROT SERPL-MCNC: 7.6 G/DL (ref 6–8.2)
RBC # BLD AUTO: 5.48 M/UL (ref 4.2–5.4)
SODIUM SERPL-SCNC: 136 MMOL/L (ref 135–145)
T4 FREE SERPL-MCNC: 1.24 NG/DL (ref 0.93–1.7)
TIBC SERPL-MCNC: 307 UG/DL (ref 250–450)
TSH SERPL DL<=0.005 MIU/L-ACNC: 2.12 UIU/ML (ref 0.38–5.33)
UIBC SERPL-MCNC: 251 UG/DL (ref 110–370)
WBC # BLD AUTO: 11.7 K/UL (ref 4.8–10.8)

## 2025-04-15 PROCEDURE — 84439 ASSAY OF FREE THYROXINE: CPT

## 2025-04-15 PROCEDURE — 85027 COMPLETE CBC AUTOMATED: CPT

## 2025-04-15 PROCEDURE — 82728 ASSAY OF FERRITIN: CPT

## 2025-04-15 PROCEDURE — 3075F SYST BP GE 130 - 139MM HG: CPT | Performed by: FAMILY MEDICINE

## 2025-04-15 PROCEDURE — 82607 VITAMIN B-12: CPT

## 2025-04-15 PROCEDURE — 99214 OFFICE O/P EST MOD 30 MIN: CPT | Performed by: FAMILY MEDICINE

## 2025-04-15 PROCEDURE — 83540 ASSAY OF IRON: CPT

## 2025-04-15 PROCEDURE — 86140 C-REACTIVE PROTEIN: CPT

## 2025-04-15 PROCEDURE — 36415 COLL VENOUS BLD VENIPUNCTURE: CPT

## 2025-04-15 PROCEDURE — 80053 COMPREHEN METABOLIC PANEL: CPT

## 2025-04-15 PROCEDURE — 85652 RBC SED RATE AUTOMATED: CPT

## 2025-04-15 PROCEDURE — 86664 EPSTEIN-BARR NUCLEAR ANTIGEN: CPT

## 2025-04-15 PROCEDURE — 83550 IRON BINDING TEST: CPT

## 2025-04-15 PROCEDURE — 86663 EPSTEIN-BARR ANTIBODY: CPT

## 2025-04-15 PROCEDURE — 3078F DIAST BP <80 MM HG: CPT | Performed by: FAMILY MEDICINE

## 2025-04-15 PROCEDURE — 86038 ANTINUCLEAR ANTIBODIES: CPT

## 2025-04-15 PROCEDURE — 86665 EPSTEIN-BARR CAPSID VCA: CPT

## 2025-04-15 PROCEDURE — 84443 ASSAY THYROID STIM HORMONE: CPT

## 2025-04-15 RX ORDER — ONDANSETRON 4 MG/1
TABLET, ORALLY DISINTEGRATING ORAL
COMMUNITY
Start: 2025-04-01

## 2025-04-15 RX ORDER — OMEPRAZOLE 20 MG/1
20 CAPSULE, DELAYED RELEASE ORAL DAILY
COMMUNITY
Start: 2025-04-01

## 2025-04-15 RX ORDER — PROMETHAZINE HYDROCHLORIDE 25 MG/1
25 TABLET ORAL EVERY 6 HOURS PRN
Qty: 30 TABLET | Refills: 0 | Status: SHIPPED | OUTPATIENT
Start: 2025-04-15

## 2025-04-15 ASSESSMENT — FIBROSIS 4 INDEX: FIB4 SCORE: 0.25

## 2025-04-15 NOTE — PROGRESS NOTES
Verbal consent was acquired by the patient to use [employer] ambient listening note generation during this visit:  Yes      Chief complaint::Diagnoses of Fatigue, unspecified type, Body aches, Hot flashes, and Nausea were pertinent to this visit.    Assessment and Plan:   The following treatment plan was discussed:     Assessment & Plan  1. Persistent nausea: Acute. Morning and night nausea, less severe during the day. Avoiding medications due to fear of worsening symptoms.  - Comprehensive lab panel ordered  - Phenergan prescribed for severe symptoms    2. Fatigue: Acute. Exhaustion despite adequate sleep and vitamin D.  - Comprehensive lab work ordered to assess thyroid dysfunction or anemia  - On 50,000 units of vitamin D weekly    3. Palpitations: Stress-related palpitations and chest pain.  - Follow with therapy services    4. Anxiety: Health-related anxiety and panic.  - Therapy appointment scheduled for Thursday    5. Back pain: Chronic. Undergoing physical therapy. MRI showed slight disc abnormality.  - Gabapentin therapy planned  - SSRIs if ineffective  - Needle injections considered if interventions fail    Follow-up  - Therapy appointment scheduled for Thursday  Rick was seen today for nausea, loss of appetite, tired and sweats.    Diagnoses and all orders for this visit:    Fatigue, unspecified type  -     CBC WITHOUT DIFFERENTIAL; Future  -     Comp Metabolic Panel; Future  -     ESTIMATED GFR; Future  -     TSH+FREE T4  -     VITAMIN B12; Future  -     FERRITIN; Future  -     IRON/TOTAL IRON BIND; Future  -     Sed Rate; Future  -     CRP QUANTITIVE (NON-CARDIAC); Future  -     EBV ACUTE INFECTION AB PANEL; Future  -     ANTI-NUCLEAR ANTIBODY SERUM; Future    Body aches  -     CBC WITHOUT DIFFERENTIAL; Future  -     Comp Metabolic Panel; Future  -     ESTIMATED GFR; Future  -     TSH+FREE T4  -     VITAMIN B12; Future  -     FERRITIN; Future  -     IRON/TOTAL IRON BIND; Future  -     Sed Rate;  Future  -     CRP QUANTITIVE (NON-CARDIAC); Future  -     EBV ACUTE INFECTION AB PANEL; Future  -     ANTI-NUCLEAR ANTIBODY SERUM; Future    Hot flashes  -     CBC WITHOUT DIFFERENTIAL; Future  -     Comp Metabolic Panel; Future  -     ESTIMATED GFR; Future  -     TSH+FREE T4  -     VITAMIN B12; Future  -     FERRITIN; Future  -     IRON/TOTAL IRON BIND; Future  -     Sed Rate; Future  -     CRP QUANTITIVE (NON-CARDIAC); Future  -     EBV ACUTE INFECTION AB PANEL; Future  -     ANTI-NUCLEAR ANTIBODY SERUM; Future    Nausea  -     promethazine (PHENERGAN) 25 MG Tab; Take 1 Tablet by mouth every 6 hours as needed for Nausea/Vomiting.        Followup: Return if symptoms worsen or fail to improve.    Subjective/HPI:     HPI:    Jessica Schafer is a pleasant 24 y.o. person here for   Chief Complaint   Patient presents with    Nausea     Morning and night x 2 wks ,     Loss of Appetite    Tired    Sweats     Night sweats         History of Present Illness  The patient is a 24-year-old individual experiencing biweekly illness symptoms. The symptoms began after a delayed testosterone injection.    Nausea  - Severe nausea occurring morning and night  - Worsened postprandially  - Zofran ineffective for nausea; sublingual form exacerbated symptoms on 04/01/2025    Sleep disturbances and fatigue  - Persistent fatigue despite vitamin D supplementation  - Decreased appetite and energy    Respiratory symptoms  - Dry cough and morning congestion inducing nausea  - No fevers, but hot flashes and night sweats attributed to testosterone therapy    Concerns about diabetes  - Concern about diabetes despite normal A1c    Back pain  - Undergoing physical therapy for back pain  - MRI showed slight disc abnormality  - Gabapentin therapy planned, followed by SSRIs if ineffective    Cardiac symptoms  - Palpitations and chest pain, stress-related  - Zio patch showed nonsustained SVT    Mental health  - Anxiety and panic about  "health  - Therapy appointment scheduled for Thursday    Supplemental information: No recent sick contacts.    Current Medicines (including changes today)  Current Outpatient Medications   Medication Sig Dispense Refill    omeprazole (PRILOSEC) 20 MG delayed-release capsule Take 20 mg by mouth every day.      ondansetron (ZOFRAN ODT) 4 MG TABLET DISPERSIBLE DISSOLVE 1 TABLET ON THE TONGUE DAILY AS NEEDED FOR NAUSEA      promethazine (PHENERGAN) 25 MG Tab Take 1 Tablet by mouth every 6 hours as needed for Nausea/Vomiting. 30 Tablet 0    gabapentin (NEURONTIN) 100 MG Cap Take 1-3 Capsules by mouth at bedtime as needed (pain). 90 Capsule 3    albuterol 108 (90 Base) MCG/ACT Aero Soln inhalation aerosol Inhale 2 Puffs every 6 hours as needed for Shortness of Breath. 8.5 g 0    D3-50 1.25 MG (13640 UT) Cap TAKE 1 CAPSULE BY MOUTH ONE DAY A WEEK FOR 8 WEEKS      ondansetron (ZOFRAN) 4 MG Tab tablet Take 1 Tablet by mouth every four hours as needed for Nausea/Vomiting. 20 Tablet 11    tizanidine (ZANAFLEX) 4 MG Tab Take 1 Tablet by mouth at bedtime as needed (muscle spasms). 30 Tablet 2    meloxicam (MOBIC) 15 MG tablet Take 1 Tablet by mouth every day. 90 Tablet 3    testosterone cypionate (DEPO-TESTOSTERONE) 200 MG/ML injection Inject 50 mg into the shoulder, thigh, or buttocks one time.      BD SYRINGE SLIP TIP 25G X 5/8\" 1 ML Misc USE AS DIRECTED TO INJECT MEDICATION SUBCUTANEOUSLY      CIMZIA, 2 SYRINGE, 200 MG/ML Prefilled Syringe Kit       albuterol 108 (90 Base) MCG/ACT Aero Soln inhalation aerosol albuterol sulfate HFA 90 mcg/actuation aerosol inhaler   Inhale 2 puffs every 4 hours by inhalation route as needed.   Take as needed for shortness of breath and/or wheezing; take before exercise       No current facility-administered medications for this visit.     Past Medical/ Surgical History  He  has a past medical history of Asthma, Mass of left wrist (05/01/2024), and NSVT (nonsustained ventricular tachycardia) " "(HCC).  He  has a past surgical history that includes pr exc tumor soft tiss forearm&/wrist subfas* (Left, 7/23/2024).       Objective:   /78   Pulse 92   Temp 37.2 °C (98.9 °F)   Ht 1.778 m (5' 10\")   Wt (!) 187 kg (413 lb)   SpO2 97%  Body mass index is 59.26 kg/m².      Physical Exam  Constitutional:       General: He is not in acute distress.     Appearance: He is morbidly obese. He is not ill-appearing or toxic-appearing.   HENT:      Head: Normocephalic.      Right Ear: Tympanic membrane and external ear normal.      Left Ear: Tympanic membrane and external ear normal.      Nose: Nose normal. No rhinorrhea.      Mouth/Throat:      Mouth: Mucous membranes are moist.      Pharynx: Oropharynx is clear. No posterior oropharyngeal erythema.   Eyes:      General:         Right eye: No discharge.         Left eye: No discharge.      Conjunctiva/sclera: Conjunctivae normal.      Pupils: Pupils are equal, round, and reactive to light.   Cardiovascular:      Rate and Rhythm: Normal rate and regular rhythm.      Heart sounds: No murmur heard.  Pulmonary:      Effort: Pulmonary effort is normal. No respiratory distress.      Breath sounds: Normal breath sounds. No wheezing.   Abdominal:      General: Abdomen is flat.   Musculoskeletal:         General: No swelling or tenderness.      Cervical back: Normal range of motion and neck supple.      Right lower leg: No edema.      Left lower leg: No edema.   Skin:     General: Skin is warm.   Neurological:      General: No focal deficit present.      Mental Status: He is alert and oriented to person, place, and time. Mental status is at baseline.   Psychiatric:         Mood and Affect: Mood normal.          Lab/ Imaging Results:  No labs or imaging to review    Please note that this dictation was created using voice recognition software. I have made every reasonable attempt to correct obvious errors, but I expect that there are errors of grammar and possibly content " that I did not discover before finalizing the note.

## 2025-04-16 ENCOUNTER — PHYSICAL THERAPY (OUTPATIENT)
Dept: PHYSICAL THERAPY | Facility: REHABILITATION | Age: 25
End: 2025-04-16
Attending: STUDENT IN AN ORGANIZED HEALTH CARE EDUCATION/TRAINING PROGRAM
Payer: COMMERCIAL

## 2025-04-16 DIAGNOSIS — G89.29 CHRONIC BILATERAL LOW BACK PAIN WITHOUT SCIATICA: ICD-10-CM

## 2025-04-16 DIAGNOSIS — M54.50 CHRONIC BILATERAL LOW BACK PAIN WITHOUT SCIATICA: ICD-10-CM

## 2025-04-16 LAB
FERRITIN SERPL-MCNC: 82.1 NG/ML (ref 10–291)
VIT B12 SERPL-MCNC: 371 PG/ML (ref 211–911)

## 2025-04-16 PROCEDURE — 97110 THERAPEUTIC EXERCISES: CPT

## 2025-04-16 NOTE — OP THERAPY DAILY TREATMENT
Outpatient Physical Therapy  DAILY TREATMENT     [facility] Physical [address]  [address]  [place] [address]  Phone:  [phone]  Fax:  [phone]    Date: 2025    Patient: Rick Schafer  YOB: 2000  MRN: [id number]     Time Calculation    Start time: 0945  Stop time: 1025 Time Calculation (min): 40 minutes         Chief Complaint: Back Problem    Visit #: 10    SUBJECTIVE:  No changes reported since last visit noted, no flare up or exacerbation following progressions last visit. Continues to feel fatigued daily and painful in low back with all standing, lifting, and prolonged positions.    Mechanism of injury:  Pt reports low back pain starting in 2024 after putting snow chains on. 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  Pain:     Current pain ratin    Quality:  Tingling, pressure and radiating    OBJECTIVE:  Current objective measures:     L/S AROM:  Flex: 75% - improved by 10%  Ext: 100%  LLF: 75% with pain - improved to no pain just report of tightness  RLF: 90%     T/S AROM:  LR: 90% - improved to 100%   RR: 90% - improved to 100%      Hip MMT:   Flex: 5/5 bilat  Ext: L 3+/5, R 4+/5 - improved 4+/5 bilat  Abd:4+/5 bilat     Bridge hold: 1min, 10s - improved to 1:42     Hip PROM:  Flex: WFL bilat  Ext: 5deg bilat  Abd: WFL  IR: WFL  ER: 45deg bilat     PA's: hypomobile L3-S1  Tenderness to palpation: very tender R>L lumbar paraspinals, glute max, and piriformis - partial improvement, intensity of tenderness is decreased  Posture: moderate rounded shoulders  Gait: wide SLOAN, hip ER bilat          Therapeutic Exercises (CPT 84842):     1. Shuttle leg press, 6C DL  for warm up, then same SL to fatigue x 3 ea    2. EMELINA TRX row, 2x10 ea    3. Loaded EMELINA carry, 20#, 3x50ft ea    7. Ball bridges, 10s holds, to fatigue, NT    11. lat walks, BTB around knees 3x10 steps, NT    12. rows/ext, BTB 2x12 ea, NT    13. wall push ups, 2x10, NT    14. paloff press, doubled GTB 2x8 ea, NT    15. Lat pulldowns, 40-50# to fatigue, NT    16. Step downs 4in, 2x8 ea    17. Squat to bench, 3x10, 15#    18. Prone opp arm/leg lifts, 5x5s holds, 3 sets ea, NT      Time-based treatments/modalities:    Physical Therapy Timed Treatment Charges  Therapeutic exercise minutes (CPT 90294): 40 minutes      ASSESSMENT:   Pt has completed 10 visits of PT meeting all short term goals and progressing toward long term goals. Demo's progression of load tolerance for squats and lumbar ROM improvement despite continued intensity of pain. Impairments continue to include pain with prolonged standing and with lifting, limited sleep quality. The patient will benefit from continued skilled therapy at increased frequency from 1x/wk to 2x/wk as originally prescribed with focus on strength and ROM deficits in order to increase participation with daily tasks. The pt states he understands and agrees to the POC.        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  Planned interventions for next visit: continue with current treatment.

## 2025-04-17 ENCOUNTER — RESULTS FOLLOW-UP (OUTPATIENT)
Dept: MEDICAL GROUP | Facility: MEDICAL CENTER | Age: 25
End: 2025-04-17
Payer: COMMERCIAL

## 2025-04-17 DIAGNOSIS — R19.5 CHANGE IN STOOL: ICD-10-CM

## 2025-04-17 DIAGNOSIS — R53.83 FATIGUE, UNSPECIFIED TYPE: ICD-10-CM

## 2025-04-17 DIAGNOSIS — R52 BODY ACHES: ICD-10-CM

## 2025-04-17 DIAGNOSIS — R23.2 HOT FLASHES: ICD-10-CM

## 2025-04-17 LAB
EBV EA-D IGG SER-ACNC: <5 U/ML (ref 0–10.9)
EBV NA IGG SER IA-ACNC: <3 U/ML (ref 0–21.9)
EBV VCA IGG SER IA-ACNC: <10 U/ML (ref 0–21.9)
EBV VCA IGM SER IA-ACNC: <10 U/ML (ref 0–43.9)
NUCLEAR IGG SER QL IA: NORMAL

## 2025-04-17 NOTE — OP THERAPY PROGRESS SUMMARY
Outpatient Physical Therapy  PROGRESS SUMMARY NOTE      [facility] Physical Therapy [address].  [address]  [place] [address]  Phone:  [phone]  Fax:  [phone]    Date of Visit: 04/16/2025    Patient: Rick Scahfer  YOB: 2000  MRN: [id number]     Referring Provider: Олег Hayward D.O.  [address]  [address]  [place] [address]   Referring Diagnosis Low back pain, unspecified [M54.50];Other chronic pain [G89.29]     Visit Diagnoses     ICD-10-CM   1. Chronic bilateral low back pain without sciatica  M54.50    G89.29       Rehab Potential: fair    Progress Report Period: 3/5/25-4/16/25    Functional Assessment Used          Objective Findings and Assessment:   Patient progression towards goals: Pt progressing, continue    Objective findings and assessment details: Pt has completed 10 visits of PT meeting all short term goals and progressing toward long term goals. Demo's progression of load tolerance for squats and lumbar ROM improvement despite continued intensity of pain. Impairments continue to include pain with prolonged standing and with lifting, limited sleep quality. The patient will benefit from continued skilled therapy at increased frequency from 1x/wk to 2x/wk as originally prescribed with focus on strength and ROM deficits in order to increase participation with daily tasks. The pt states he understands and agrees to the POC.    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     Certification Period: 04/16/2025 to 06/26/25    Physician Signature: ________________________________ Date: ______________

## 2025-04-21 ENCOUNTER — PHYSICAL THERAPY (OUTPATIENT)
Dept: PHYSICAL THERAPY | Facility: REHABILITATION | Age: 25
End: 2025-04-21
Attending: STUDENT IN AN ORGANIZED HEALTH CARE EDUCATION/TRAINING PROGRAM
Payer: COMMERCIAL

## 2025-04-21 DIAGNOSIS — G89.29 CHRONIC BILATERAL LOW BACK PAIN WITHOUT SCIATICA: ICD-10-CM

## 2025-04-21 DIAGNOSIS — M54.50 CHRONIC BILATERAL LOW BACK PAIN WITHOUT SCIATICA: ICD-10-CM

## 2025-04-21 PROCEDURE — 97110 THERAPEUTIC EXERCISES: CPT

## 2025-04-21 NOTE — OP THERAPY DAILY TREATMENT
Outpatient Physical Therapy  DAILY TREATMENT     [facility] Physical [address]  [address]  [place] [address]  Phone:  [phone]  Fax:  [phone]    Date: 2025    Patient: Rick Schafer  YOB: 2000  MRN: [id number]     Time Calculation    Start time: 1345  Stop time: 1430 Time Calculation (min): 45 minutes         Chief Complaint: Back Problem    Visit #: 11    SUBJECTIVE:  His back    Mechanism of injury:  Pt reports low back pain starting in 2024 after putting snow chains on. 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  Pain:     Current pain ratin    Quality:  Tingling, pressure and radiating    OBJECTIVE:  Current objective measures:     L/S AROM:  Flex: 75% - improved by 10%  Ext: 100%  LLF: 75% with pain - improved to no pain just report of tightness  RLF: 90%     T/S AROM:  LR: 90% - improved to 100%   RR: 90% - improved to 100%      Hip MMT:   Flex: 5/5 bilat  Ext: L 3+/5, R 4+/5 - improved 4+/5 bilat  Abd:4+/5 bilat     Bridge hold: 1min, 10s - improved to 1:42     Hip PROM:  Flex: WFL bilat  Ext: 5deg bilat  Abd: WFL  IR: WFL  ER: 45deg bilat     PA's: hypomobile L3-S1  Tenderness to palpation: very tender R>L lumbar paraspinals, glute max, and piriformis - partial improvement, intensity of tenderness is decreased  Posture: moderate rounded shoulders  Gait: wide SLOAN, hip ER bilat          Therapeutic Exercises (CPT 87906):     1. Shuttle leg press, 6C DL for warm up, then same SL to fatigue x 3 ea    2. EMELINA TRX row, 2x10 ea, NT    3. Loaded EMELINA carry, 20#, 3x50ft ea    4. PNF chop 10#, 2x10 ea    7. Ball bridges, 10s holds, to fatigue, NT    10. Modified plank  on mat table, 3x30s holds    11. lat walks, BTB around knees 3x10 steps, NT    12. rows/ext, BTB 2x12 ea, NT    13. wall push ups, 2x10, NT    14. paloff press, doubled GTB 2x8 ea, NT    15. Lat pulldowns, 40-50# to fatigue, NT    16. Step downs 4in, 8x ea    17. Squat to bench, 3x10, 20#    18. Prone opp arm/leg lifts, 5x5s holds, 3 sets ea, NT      Time-based treatments/modalities:    Physical Therapy Timed Treatment Charges  Therapeutic exercise minutes (CPT 30553): 45 minutes      ASSESSMENT:   Progress squat load and ankle of planks with slight increase of back pain but staying below 4/10, pt improving very slowly but steadily in tolerance of functional tasks    Goals:   Short Term Goals:   Lumbar flexion and R SB AROM improved by 10%  - met  Compliant with HEP 3x/wk or more to improved self efficacy - met  Pt will be able to squat holding 15# - MET    NEW  Pt will be able to squat holding 30# to progress lifting tolerance        Long Term Goals:    MARIZOL improved by 18% to indicate functional improvement (64% at eval) - DNT continue  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  Planned interventions for next visit: continue with current treatment.

## 2025-04-24 NOTE — Clinical Note
REFERRAL APPROVAL NOTICE         Sent on April 24, 2025                   Rick Schafer  [address]  [place] [address]                   Dear Mr. Schafer,    After a careful review of the medical information and benefit coverage, [facility] has processed your referral. See below for additional details.    If applicable, you must be actively enrolled with your insurance for coverage of the authorized service. If you have any questions regarding your coverage, please contact your insurance directly.    REFERRAL INFORMATION   Referral #:  [id number]  Referred-To Provider    Referred-By Provider:  Gastroenterology    K[place]EN [place]   [facility]      [address]  [address]  [place] [id number] [phone] [address]  [place] [address] [phone]    Referral Start Date:  04/18/2025  Referral End Date:   04/18/2026             SCHEDULING  If you do not already have an appointment, please call [phone] to make an appointment.     MORE INFORMATION  If you do not already have a [employer] account, sign up at: [email or web address]  You can access your medical information, make appointments, see lab results, billing information, and more.  If you have questions regarding this referral, please contact  the [facility] Referrals department at:             [phone]. Monday - Friday 8:00AM - 5:00PM.     Sincerely,    [facility]

## 2025-04-25 ENCOUNTER — PHYSICAL THERAPY (OUTPATIENT)
Dept: PHYSICAL THERAPY | Facility: REHABILITATION | Age: 25
End: 2025-04-25
Attending: STUDENT IN AN ORGANIZED HEALTH CARE EDUCATION/TRAINING PROGRAM
Payer: COMMERCIAL

## 2025-04-25 DIAGNOSIS — M54.50 CHRONIC BILATERAL LOW BACK PAIN WITHOUT SCIATICA: ICD-10-CM

## 2025-04-25 DIAGNOSIS — G89.29 CHRONIC BILATERAL LOW BACK PAIN WITHOUT SCIATICA: ICD-10-CM

## 2025-04-25 PROCEDURE — 97110 THERAPEUTIC EXERCISES: CPT

## 2025-04-25 NOTE — OP THERAPY DAILY TREATMENT
Outpatient Physical Therapy  DAILY TREATMENT     [facility] Physical Therapy [address].  [address]  [place] [address]  Phone:  [phone]  Fax:  [phone]    Date: 2025    Patient: Rick Schafer  YOB: 2000  MRN: [id number]     Time Calculation    Start time: 1405  Stop time: 1445 Time Calculation (min): 40 minutes         Chief Complaint: Back Problem    Visit #: 12    SUBJECTIVE:  His back pain is moderate today with standing    Mechanism of injury:  Pt reports low back pain starting in 2024 after putting snow chains on. 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  Pain:     Current pain ratin    Quality:  Tingling, pressure and radiating    OBJECTIVE:  Current objective measures:     L/S AROM:  Flex: 75% - improved by 10%  Ext: 100%  LLF: 75% with pain - improved to no pain just report of tightness  RLF: 90%     T/S AROM:  LR: 90% - improved to 100%   RR: 90% - improved to 100%      Hip MMT:   Flex: 5/5 bilat  Ext: L 3+/5, R 4+/5 - improved 4+/5 bilat  Abd:4+/5 bilat     Bridge hold: 1min, 10s - improved to 1:42     Hip PROM:  Flex: WFL bilat  Ext: 5deg bilat  Abd: WFL  IR: WFL  ER: 45deg bilat     PA's: hypomobile L3-S1  Tenderness to palpation: very tender R>L lumbar paraspinals, glute max, and piriformis - partial improvement, intensity of tenderness is decreased  Posture: moderate rounded shoulders  Gait: wide SLOAN, hip ER bilat          Therapeutic Exercises (CPT 17619):     1. Shuttle leg press, 6C DL for warm up, then same SL to fatigue x 3 ea    2. EMELINA TRX row, 2x10 ea, NT    3. Loaded EMELINA carry, 30#, 3x50ft ea    4. PNF chop (PTB) and lift (BTB), 2x10 ea    5. Seated  ball rolls, 3min    7. Ball bridges, 10s holds, to fatigue, NT    10. Modified plank on mat table, 3x30s holds, NT    11. lat walks, BTB around knees 3x10 steps, NT    12. rows/ext, BTB 2x12 ea, NT    13. wall push ups, 2x10, NT    14. paloff press, doubled GTB 2x8 ea, NT    15. Lat pulldowns, 40-50# to fatigue, NT    16. Step downs 4in, 8x ea, NT    17. Squat to bench, 3x10, 30#    18. Prone opp arm/leg lifts, 5x5s holds, 3 sets ea, NT      Time-based treatments/modalities:    Physical Therapy Timed Treatment Charges  Therapeutic exercise minutes (CPT 35489): 40 minutes      ASSESSMENT:   Progressed squat load by 10# this visit, reported moderate aggravation by end of session with addition of resisted thoracic rotation, pt will benefit from continued skilled PT to progress strengthening and ROM as tolerated, improved self efficacy, and progressed toward meaningful functional activities    Goals:   Short Term Goals:   Lumbar flexion and R SB AROM improved by 10%  - met  Compliant with HEP 3x/wk or more to improved self efficacy - met  Pt will be able to squat holding 15# - MET    NEW  Pt will be able to squat holding 30# to progress lifting tolerance        Long Term Goals:    MARIZOL improved by 18% to indicate functional improvement (64% at eval) - DNT continue  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  Planned interventions for next visit: continue with current treatment.

## 2025-05-01 ENCOUNTER — APPOINTMENT (OUTPATIENT)
Dept: PHYSICAL MEDICINE AND REHAB | Facility: MEDICAL CENTER | Age: 25
End: 2025-05-01
Payer: COMMERCIAL

## 2025-05-01 VITALS
DIASTOLIC BLOOD PRESSURE: 90 MMHG | HEART RATE: 85 BPM | BODY MASS INDEX: 41.95 KG/M2 | HEIGHT: 70 IN | TEMPERATURE: 97.6 F | OXYGEN SATURATION: 95 % | WEIGHT: 293 LBS | SYSTOLIC BLOOD PRESSURE: 151 MMHG

## 2025-05-01 DIAGNOSIS — M54.50 CHRONIC BILATERAL LOW BACK PAIN WITHOUT SCIATICA: ICD-10-CM

## 2025-05-01 DIAGNOSIS — G89.29 CHRONIC BILATERAL LOW BACK PAIN WITHOUT SCIATICA: ICD-10-CM

## 2025-05-01 PROCEDURE — 3077F SYST BP >= 140 MM HG: CPT | Performed by: STUDENT IN AN ORGANIZED HEALTH CARE EDUCATION/TRAINING PROGRAM

## 2025-05-01 PROCEDURE — 1125F AMNT PAIN NOTED PAIN PRSNT: CPT | Performed by: STUDENT IN AN ORGANIZED HEALTH CARE EDUCATION/TRAINING PROGRAM

## 2025-05-01 PROCEDURE — 99214 OFFICE O/P EST MOD 30 MIN: CPT | Performed by: STUDENT IN AN ORGANIZED HEALTH CARE EDUCATION/TRAINING PROGRAM

## 2025-05-01 PROCEDURE — 3080F DIAST BP >= 90 MM HG: CPT | Performed by: STUDENT IN AN ORGANIZED HEALTH CARE EDUCATION/TRAINING PROGRAM

## 2025-05-01 RX ORDER — DULOXETIN HYDROCHLORIDE 60 MG/1
60 CAPSULE, DELAYED RELEASE ORAL DAILY
Qty: 30 CAPSULE | Refills: 1 | Status: SHIPPED | OUTPATIENT
Start: 2025-05-01

## 2025-05-01 ASSESSMENT — PATIENT HEALTH QUESTIONNAIRE - PHQ9
SUM OF ALL RESPONSES TO PHQ QUESTIONS 1-9: 8
5. POOR APPETITE OR OVEREATING: 1 - SEVERAL DAYS
CLINICAL INTERPRETATION OF PHQ2 SCORE: 3

## 2025-05-01 ASSESSMENT — FIBROSIS 4 INDEX: FIB4 SCORE: 0.28

## 2025-05-01 ASSESSMENT — PAIN SCALES - GENERAL: PAINLEVEL_OUTOF10: 7=MODERATE-SEVERE PAIN

## 2025-05-01 NOTE — PROGRESS NOTES
[facility] (Physical Medicine and Rehabilitation)  Sports Medicine& Interventional Spine   Follow Up Patient Visit      Chief complaint:   Chief Complaint   Patient presents with    Follow-Up     Back pain          HISTORY        HPI  Patient identification: Jessica Schafer ,  2000,   With The encounter diagnosis was Chronic bilateral low back pain without sciatica.         At last visit dated 25   Chronic low back pain.  The patient's chronic low back pain is not indicative of any severe neurological or structural complications, as evidenced by both the physical examination and imaging studies (x-ray and MRI). The primary concern is to achieve effective pain control.  He has been informed about the potential benefits of anticonvulsant medications such as gabapentin and antidepressants like duloxetine (Cymbalta) in modulating pain. A prescription for gabapentin 100 mg has been issued, with instructions to take one capsule at night for a few days before increasing the dosage to three capsules. He has been advised to continue with physical therapy sessions twice a week for the next few weeks. Can consider duloxetine at following    Follow-up  The patient will follow up in 3 to 4 weeks.     Interval hx:  History of Present Illness        The patient is a 24-year-old male who presents today for repeat evaluation of chronic low back pain. Pain today is reported to be 6-7 out of 10 in severity. Back pain is reported to be similar from previous evaluation. At last visit, patient was started on gabapentin.    He reports no significant change in his condition since the last visit. He has been adhering to a regimen of gabapentin 200 mg but has not observed any noticeable improvement. Additionally, he experiences fatigue as a side effect of the medication. He expresses interest in exploring alternative treatment options, including increasing the dosage of gabapentin or introducing a second  medication. He is also considering the continuation of physical therapy sessions twice a week, which he believes have been beneficial. He is currently attending these sessions at a facility on [facility]. His mobility is limited, with the ability to walk only for 5 to 10 minutes at a time and stand for 30 minutes at a time. He also experiences difficulty with bending over and with pulling or carrying objects.    MEDICATIONS  Current: Gabapentin           ROS Red Flags :   Fever, Chills, Sweats: Denies  Involuntary Weight Loss: Denies  Bowel/Bladder Incontinence: Denies  Saddle Anesthesia: Denies        PMHx:   Past Medical History:   Diagnosis Date    Asthma     Mass of left wrist 05/01/2024    Surgical intervention    NSVT (nonsustained ventricular tachycardia) (HCC)        PSHx:   Past Surgical History:   Procedure Laterality Date    PB EXC TUMOR SOFT TISS FOREARM&/WRIST SUBFAS* Left 7/23/2024    Procedure: LEFT WRIST MASS EXCISION;  Surgeon: Juarez Ordonez M.D.;  Location: [facility];  Service: Orthopedics       Family history   Family History   Problem Relation Age of Onset    Hypertension Mother     Thyroid Mother         hypothyroid    Hypertension Father     Depression Father     Anxiety disorder Father     Bipolar disorder Father     Depression Sister     Other Sister         PCOS    Depression Brother     Other Maternal Grandmother         AML    Diabetes Maternal Grandfather     Cancer Maternal Grandfather         pancreatic    Cancer Paternal Grandmother         skin    Lung Disease Paternal Grandmother         COPD, SMK         Medications:   Outpatient Medications Marked as Taking for the 5/1/25 encounter (Office Visit) with Олег Hayward D.O.   Medication Sig Dispense Refill    DULoxetine (CYMBALTA) 60 MG Cap DR Particles delayed-release capsule Take 1 Capsule by mouth every day. 30 Capsule 1    omeprazole (PRILOSEC) 20 MG delayed-release capsule Take 20 mg by mouth every  "day.      ondansetron (ZOFRAN ODT) 4 MG TABLET DISPERSIBLE DISSOLVE 1 TABLET ON THE TONGUE DAILY AS NEEDED FOR NAUSEA      promethazine (PHENERGAN) 25 MG Tab Take 1 Tablet by mouth every 6 hours as needed for Nausea/Vomiting. 30 Tablet 0    gabapentin (NEURONTIN) 100 MG Cap Take 1-3 Capsules by mouth at bedtime as needed (pain). 90 Capsule 3    albuterol 108 (90 Base) MCG/ACT Aero Soln inhalation aerosol Inhale 2 Puffs every 6 hours as needed for Shortness of Breath. 8.5 g 0    D3-50 1.25 MG (64714 UT) Cap TAKE 1 CAPSULE BY MOUTH ONE DAY A WEEK FOR 8 WEEKS      ondansetron (ZOFRAN) 4 MG Tab tablet Take 1 Tablet by mouth every four hours as needed for Nausea/Vomiting. 20 Tablet 11    tizanidine (ZANAFLEX) 4 MG Tab Take 1 Tablet by mouth at bedtime as needed (muscle spasms). 30 Tablet 2    meloxicam (MOBIC) 15 MG tablet Take 1 Tablet by mouth every day. 90 Tablet 3    testosterone cypionate (DEPO-TESTOSTERONE) 200 MG/ML injection Inject 50 mg into the shoulder, thigh, or buttocks one time.      BD SYRINGE SLIP TIP 25G X 5/8\" 1 ML Misc USE AS DIRECTED TO INJECT MEDICATION SUBCUTANEOUSLY      CIMZIA, 2 SYRINGE, 200 MG/ML Prefilled Syringe Kit       albuterol 108 (90 Base) MCG/ACT Aero Soln inhalation aerosol albuterol sulfate HFA 90 mcg/actuation aerosol inhaler   Inhale 2 puffs every 4 hours by inhalation route as needed.   Take as needed for shortness of breath and/or wheezing; take before exercise          Current Outpatient Medications on File Prior to Visit   Medication Sig Dispense Refill    omeprazole (PRILOSEC) 20 MG delayed-release capsule Take 20 mg by mouth every day.      ondansetron (ZOFRAN ODT) 4 MG TABLET DISPERSIBLE DISSOLVE 1 TABLET ON THE TONGUE DAILY AS NEEDED FOR NAUSEA      promethazine (PHENERGAN) 25 MG Tab Take 1 Tablet by mouth every 6 hours as needed for Nausea/Vomiting. 30 Tablet 0    gabapentin (NEURONTIN) 100 MG Cap Take 1-3 Capsules by mouth at bedtime as needed (pain). 90 Capsule 3    " "albuterol 108 (90 Base) MCG/ACT Aero Soln inhalation aerosol Inhale 2 Puffs every 6 hours as needed for Shortness of Breath. 8.5 g 0    D3-50 1.25 MG (54009 UT) Cap TAKE 1 CAPSULE BY MOUTH ONE DAY A WEEK FOR 8 WEEKS      ondansetron (ZOFRAN) 4 MG Tab tablet Take 1 Tablet by mouth every four hours as needed for Nausea/Vomiting. 20 Tablet 11    tizanidine (ZANAFLEX) 4 MG Tab Take 1 Tablet by mouth at bedtime as needed (muscle spasms). 30 Tablet 2    meloxicam (MOBIC) 15 MG tablet Take 1 Tablet by mouth every day. 90 Tablet 3    testosterone cypionate (DEPO-TESTOSTERONE) 200 MG/ML injection Inject 50 mg into the shoulder, thigh, or buttocks one time.      BD SYRINGE SLIP TIP 25G X 5/8\" 1 ML Misc USE AS DIRECTED TO INJECT MEDICATION SUBCUTANEOUSLY      CIMZIA, 2 SYRINGE, 200 MG/ML Prefilled Syringe Kit       albuterol 108 (90 Base) MCG/ACT Aero Soln inhalation aerosol albuterol sulfate HFA 90 mcg/actuation aerosol inhaler   Inhale 2 puffs every 4 hours by inhalation route as needed.   Take as needed for shortness of breath and/or wheezing; take before exercise       No current facility-administered medications on file prior to visit.         Allergies:   No Known Allergies    Social Hx:   Social History     Socioeconomic History    Marital status: Single     Spouse name: Not on file    Number of children: Not on file    Years of education: Not on file    Highest education level: Not on file   Occupational History    Not on file   Tobacco Use    Smoking status: Never    Smokeless tobacco: Never   Vaping Use    Vaping status: Former   Substance and Sexual Activity    Alcohol use: Not Currently     Comment: OCC    Drug use: Never    Sexual activity: Yes     Partners: Male, Female   Other Topics Concern    Not on file   Social History Narrative    Not on file     Social Drivers of Health     Financial Resource Strain: Not on file   Food Insecurity: Not on file   Transportation Needs: Not on file   Physical Activity: Not on " "file   Stress: Not on file   Social Connections: Not on file   Intimate Partner Violence: Not on file   Housing Stability: Not on file         EXAMINATION     Physical Exam:   Vitals: BP (!) 151/90 (BP Location: Right arm, Patient Position: Sitting, BP Cuff Size: Adult)   Pulse 85   Temp 36.4 °C (97.6 °F) (Temporal)   Ht 1.778 m (5' 10\")   Wt (!) 186 kg (410 lb)   SpO2 95%     Constitutional:   Body Habitus: Body mass index is 58.83 kg/m².  Cooperation: Fully cooperates with exam  Appearance: Well-groomed no disheveled    Respiratory-  breathing comfortable on room air, no audible wheezing  Cardiovascular- capillary refills less than 2 seconds. No lower extremity edema is noted.   Psychiatric- alert and oriented ×3. Normal affect.    MSK and Neuro: -    Thoracic/Lumbar Spine/Sacral Spine/Hips   Inspection: No evidence of atrophy in bilateral lower extremities throughout   ROM: full  AROM with flexion, extension, lateral flexion, and rotation bilaterally, without pain   Palpation:   TTP at midline and paraspinal musculature and area of fold in skin at the level of L2-4.   palpation over SI joint: negative bilaterally    palpation over buttock: negative bilaterally    palpation in hip or over the greater trochanters: Which my mom never cut my hairnegative bilaterally       Lumbar spine Special tests  Neuro tension  Straight leg test negative bilaterally    Slump test right I had really curly wavy hairnegative bilaterally       HIP  FAIR test negative bilaterally    Range of motion in the hips is within normal limits in flexion, extension, abduction, internal rotation, external rotation.     SI joint tests  Observation patient sits on one buttocks: Negative  SI joint compression negative bilaterally    SI joint distraction yeahnegative bilaterally    Thigh thrust test negative bilaterally    DELONTE test negative bilaterally      Neuro   Key points for the international standards for neurological classification of " "spinal cord injury (ISNCSCI) to light touch.   Dermatome R L   L2 2 2   L3 2 2   L4 2 2   L5 2 2   S1 2 2   S2 2 2      Motor Exam Lower Extremities  ? Myotome R L   Hip flexion L2  5 5   Knee extension L3 5 5   Ankle dorsiflexion L4 5 5   Toe extension L5 5 5   Ankle plantarflexion S1 5 5         Babinski sign neg bilaterally  Clonus of the ankle negative bilaterally          MEDICAL DECISION MAKING    DATA    Labs:   Lab Results   Component Value Date/Time    SODIUM 136 04/15/2025 04:34 PM    POTASSIUM 4.0 04/15/2025 04:34 PM    CHLORIDE 101 04/15/2025 04:34 PM    CO2 23 04/15/2025 04:34 PM    GLUCOSE 78 04/15/2025 04:34 PM    BUN 14 04/15/2025 04:34 PM    CREATININE 0.91 04/15/2025 04:34 PM        No results found for: \"PROTHROMBTM\", \"INR\"     Lab Results   Component Value Date/Time    WBC 11.7 (H) 04/15/2025 04:34 PM    RBC 5.48 (H) 04/15/2025 04:34 PM    HEMOGLOBIN 16.4 (H) 04/15/2025 04:34 PM    HEMATOCRIT 48.5 (H) 04/15/2025 04:34 PM    MCV 88.5 04/15/2025 04:34 PM    MCH 29.9 04/15/2025 04:34 PM    MCHC 33.8 04/15/2025 04:34 PM    MPV 9.5 04/15/2025 04:34 PM    NEUTSPOLYS 82.20 (H) 03/31/2025 08:49 AM    LYMPHOCYTES 13.40 (L) 03/31/2025 08:49 AM    MONOCYTES 3.40 03/31/2025 08:49 AM    EOSINOPHILS 0.10 03/31/2025 08:49 AM    BASOPHILS 0.40 03/31/2025 08:49 AM        Lab Results   Component Value Date/Time    HBA1C 4.9 10/29/2024 02:30 PM          Imaging:   I personally reviewed following images      MRI of the lumbar spine dated 1/21/2025 shows normal alignment and vertebral height, no obvious acute fractures.  Small central disc protrusion at L5-S1 without canal or neuroforaminal stenosis         I reviewed the following radiology reports                         Results for orders placed in visit on 01/21/25    MR-LUMBAR SPINE-W/O    Impression  Unremarkable noncontrast MR examination of the lumbar spine except for small central disc protrusion at L5-S1 without causing spinal or neural foraminal " stenosis..        Results for orders placed in visit on 01/21/25    MR-LUMBAR SPINE-W/O    Impression  Unremarkable noncontrast MR examination of the lumbar spine except for small central disc protrusion at L5-S1 without causing spinal or neural foraminal stenosis..                                                                                        Results for orders placed during the hospital encounter of 05/19/23    DX-ANKLE 3+ VIEWS LEFT    Impression  No radiographic evidence of enthesopathy, degenerative or inflammatory arthropathy    Mild soft tissue swelling    [place] postoperative change                       Results for orders placed in visit on 11/15/22    DX-KNEE 3 VIEWS RIGHT    Impression  1. No acute osseous abnormality.    2. Unremarkable weightbearing view of the bilateral knees with no malalignment.   Results for orders placed in visit on 11/15/22    DX-KNEES-AP BILATERAL STANDING    Impression  1. No acute osseous abnormality.    2. Unremarkable weightbearing view of the bilateral knees with no malalignment.   Results for orders placed in visit on 09/30/24    DX-KNEE COMPLETE 4+ LEFT   Results for orders placed in visit on 12/05/24    DX-LUMBAR SPINE-2 OR 3 VIEWS    Impression  Mild levoscoliosis. Otherwise, normal.          Results for orders placed during the hospital encounter of 05/19/23    DX-SACROILIAC JOINTS 3+    Impression  No radiographic evidence of degenerative or inflammatory arthropathy          Results for orders placed in visit on 05/01/24    DX-WRIST-COMPLETE 3+ LEFT         DIAGNOSIS   Visit Diagnoses     ICD-10-CM   1. Chronic bilateral low back pain without sciatica  M54.50    G89.29           ASSESSMENT and PLAN:      Assessment & Plan        1. Chronic low back pain.  Pain today is reported to be 6-7 out of 10 in severity. Back pain is reported to be similar from previous evaluation. At last visit, patient was started on gabapentin. He will increase the dosage of  gabapentin to 300 mg. Additionally, a prescription for Cymbalta (duloxetine) 60 mg once daily has been provided and sent to [facility] in [facility]. He is advised to continue with his physical therapy sessions twice a week at the [facility]. If there is no improvement within the next month, he should contact via [facility] to discuss potential modifications to his treatment plan.    Follow-up  The patient will follow up in 3 months.     Rick was seen today for follow-up.    Diagnoses and all orders for this visit:    Chronic bilateral low back pain without sciatica  -     Referral to Physical Therapy    Other orders  -     DULoxetine (CYMBALTA) 60 MG Cap DR Particles delayed-release capsule; Take 1 Capsule by mouth every day.              Follow up: 3 months    I spent a total of 37 minutes on this visit including reviewing previous notes and imaging, in-room history and physical exam of the patient, counseling the patient on likely diagnosis and plan, ordering diagnostic tests, adjusting medications, and documenting the findings in the note.This is excluding procedures      Please note that this dictation was created using voice recognition software. I have made every reasonable attempt to correct obvious errors but there may be errors of grammar and content that I may have overlooked prior to finalization of this note.    Олег Hayward DO  Physical Medicine and Rehabilitation  Sports Medicine and Spine  [facility]

## 2025-05-02 NOTE — Clinical Note
REFERRAL APPROVAL NOTICE         Sent on May 2, 2025                   Rick Schafer  [address]  [place] [address]                   Dear Mr. Schafer,    After a careful review of the medical information and benefit coverage, [facility] has processed your referral. See below for additional details.    If applicable, you must be actively enrolled with your insurance for coverage of the authorized service. If you have any questions regarding your coverage, please contact your insurance directly.    REFERRAL INFORMATION   Referral #:  [id number]  Referred-To Department    Referred-By Provider:  Physical Therapy    Олег Hayward D.O.   Phys [facility] [address]      [address]  [address]  [place] [id number] [phone] [address]  [address]  [place] [id number] [phone]    Referral Start Date:  05/01/2025  Referral End Date:   05/01/2026             SCHEDULING  If you do not already have an appointment, please call [phone] to make an appointment.     MORE INFORMATION  If you do not already have a [employer] account, sign up at: [email or web address]  You can access your medical information, make appointments, see lab results, billing information, and more.  If you have questions regarding this referral, please contact  the [facility] Referrals department at:             [phone]. Monday - Friday 8:00AM - 5:00PM.     Sincerely,    [facility]

## 2025-05-12 NOTE — TELEPHONE ENCOUNTER
tizanidine (ZANAFLEX) 4 MG Tab     Received request via: Patient    Was the patient seen in the last year in this department? Yes    Does the patient have an active prescription (recently filled or refills available) for medication(s) requested? No    Pharmacy Name: [id number] - [place], [place] - [address] [facility] [address]     Does the patient have prison Plus and need 100-day supply? (This applies to ALL medications) Patient does not have SCP

## 2025-05-15 ENCOUNTER — PHYSICAL THERAPY (OUTPATIENT)
Dept: PHYSICAL THERAPY | Facility: REHABILITATION | Age: 25
End: 2025-05-15
Attending: STUDENT IN AN ORGANIZED HEALTH CARE EDUCATION/TRAINING PROGRAM
Payer: COMMERCIAL

## 2025-05-15 DIAGNOSIS — M54.50 CHRONIC BILATERAL LOW BACK PAIN WITHOUT SCIATICA: Primary | ICD-10-CM

## 2025-05-15 DIAGNOSIS — G89.29 CHRONIC BILATERAL LOW BACK PAIN WITHOUT SCIATICA: Primary | ICD-10-CM

## 2025-05-15 PROCEDURE — 97110 THERAPEUTIC EXERCISES: CPT

## 2025-05-15 NOTE — OP THERAPY DAILY TREATMENT
Outpatient Physical Therapy  DAILY TREATMENT     [facility] [address]  [address]  [place] [address]  Phone:  [phone]  Fax:  [phone]    Date: 05/15/2025    Patient: iRck Schafer  YOB: 2000  MRN: [id number]     Time Calculation    Start time: 1450  Stop time: 1530 Time Calculation (min): 40 minutes         Chief Complaint: Back Problem    Visit #: 13    SUBJECTIVE:  Pt tried anxiety meds prescribed which made him sick. Has been having increased fatigue, nausea and diarrhea for the last 6 weeks. Wants to get a  but having difficulty with the finances.     Mechanism of injury:  Pt reports low back pain starting in 2024 after putting snow chains on. 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  Pain:     Current pain ratin    Quality:  Tingling, pressure and radiating    OBJECTIVE:  Current objective measures:     L/S AROM:  Flex: 75% - improved by 10%  Ext: 100%  LLF: 75% with pain - improved to no pain just report of tightness  RLF: 90%     T/S AROM:  LR: 90% - improved to 100%   RR: 90% - improved to 100%      Hip MMT:   Flex: 5/5 bilat  Ext: L 3+/5, R 4+/5 - improved 4+/5 bilat  Abd:4+/5 bilat     Bridge hold: 1min, 10s - improved to 1:42     Hip PROM:  Flex: WFL bilat  Ext: 5deg bilat  Abd: WFL  IR: WFL  ER: 45deg bilat     PA's: hypomobile L3-S1  Tenderness to palpation: very tender R>L lumbar paraspinals, glute max, and piriformis - partial improvement, intensity of tenderness is decreased  Posture: moderate rounded shoulders  Gait: wide SLOAN, hip ER bilat          Therapeutic Exercises (CPT 21241):     1. Shuttle leg press, 6C DL for warm up,  then same SL to fatigue x 3 ea, NT    2. EMELINA TRX row, 2x10 ea, NT    3. Loaded EMELINA carry, 30#, 3x50ft ea, NT    4. PNF chop (PTB) and lift (BTB), 2x10 ea, NT    5. Seated ball rolls, 3min, NT    6. Recumbent bike, 5min warm up, L3    7. Ball bridges, 10s holds, to fatigue, NT    10. Modified plank on mat table with twists, 8x ea    11. lat walks, BTB around knees 3x10 steps, NT    12. rows/ext, BTB 2x12 ea, NT    13. wall push ups, 2x10, NT    14. paloff press, doubled GTB 2x8 ea, NT    15. Lat pulldowns, 60-80# 3x10    16. Step downs 4in, 8x ea, NT    17. Squat to bench, 3x10, 30#    18. Prone opp arm/leg lifts, 5x5s holds, 3 sets ea, NT      Time-based treatments/modalities:    Physical Therapy Timed Treatment Charges  Therapeutic exercise minutes (CPT 40940): 40 minutes      ASSESSMENT:   Progressed lat pulldown load by 30# without increase of symptoms, pt is being followed by GI MD next week for continued global illness symptoms, pt will benefit from continued skilled PT to progress strengthening and ROM as tolerated, improved self efficacy, and progressed toward meaningful functional activities    Goals:   Short Term Goals:   Lumbar flexion and R SB AROM improved by 10%  - met  Compliant with HEP 3x/wk or more to improved self efficacy - met  Pt will be able to squat holding 15# - MET    NEW  Pt will be able to squat holding 30# to progress lifting tolerance        Long Term Goals:    MARIZOL improved by 18% to indicate functional improvement (64% at eval) - DNT continue  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  Planned interventions for next visit: continue with current treatment.

## 2025-05-22 ENCOUNTER — PHYSICAL THERAPY (OUTPATIENT)
Dept: PHYSICAL THERAPY | Facility: REHABILITATION | Age: 25
End: 2025-05-22
Attending: STUDENT IN AN ORGANIZED HEALTH CARE EDUCATION/TRAINING PROGRAM
Payer: COMMERCIAL

## 2025-05-22 DIAGNOSIS — G89.29 CHRONIC BILATERAL LOW BACK PAIN WITHOUT SCIATICA: Primary | ICD-10-CM

## 2025-05-22 DIAGNOSIS — M54.50 CHRONIC BILATERAL LOW BACK PAIN WITHOUT SCIATICA: Primary | ICD-10-CM

## 2025-05-22 PROCEDURE — 97110 THERAPEUTIC EXERCISES: CPT

## 2025-05-22 NOTE — OP THERAPY DAILY TREATMENT
Outpatient Physical Therapy  DAILY TREATMENT     [facility] [address]  [address]  [place] [address]  Phone:  [phone]  Fax:  [phone]    Date: 2025    Patient: Rick Schafer  YOB: 2000  MRN: [id number]     Time Calculation    Start time: 1405  Stop time: 1445 Time Calculation (min): 40 minutes         Chief Complaint: Back Problem    Visit #: 14    SUBJECTIVE:  Pt tried anxiety meds prescribed which made him sick. Has been having increased fatigue, nausea and diarrhea for the last 6 weeks. Wants to get a  but having difficulty with the finances.     Mechanism of injury:  Pt reports low back pain starting in 2024 after putting snow chains on. 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  Pain:     Current pain ratin    Quality:  Tingling, pressure and radiating    OBJECTIVE:  Current objective measures:     L/S AROM:  Flex: 75% - improved by 10%  Ext: 100%  LLF: 75% with pain - improved to no pain just report of tightness  RLF: 90%     T/S AROM:  LR: 90% - improved to 100%   RR: 90% - improved to 100%      Hip MMT:   Flex: 5/5 bilat  Ext: L 3+/5, R 4+/5 - improved 4+/5 bilat  Abd:4+/5 bilat     Bridge hold: 1min, 10s - improved to 1:42     Hip PROM:  Flex: WFL bilat  Ext: 5deg bilat  Abd: WFL  IR: WFL  ER: 45deg bilat     PA's: hypomobile L3-S1  Tenderness to palpation: very tender R>L lumbar paraspinals, glute max, and piriformis - partial improvement, intensity of tenderness is decreased  Posture: moderate rounded shoulders  Gait: wide SLOAN, hip ER bilat          Therapeutic Exercises (CPT 16273):     1. Shuttle leg press, 7C DL for warm up,  then same SL to fatigue x 3 ea    2. EMELINA TRX row, 2x10 ea, NT    3. Loaded EMELINA carry, 30#, 3x50ft ea, NT    4. PNF chop (PTB) and lift (BTB), 2x10 ea, NT    5. Seated ball rolls, 3min, NT    6. Recumbent bike, 5min warm up, L3, NT    7. Ball bridges, 10s holds, to fatigue, NT    10. Modified plank on mat table with twists, 8x ea, NT    11. lat walks, BTB around knees 3x10 steps, NT    12. rows/ext, BTB 2x12 ea, NT    13. wall push ups, 2x10, NT    14. paloff press, doubled GTB 2x8 ea, NT    15. Lat pulldowns, 80-90# 3x10    16. Step downs 4in, 8x ea, NT    17. Squat to bench, 3x10, 35#    18. Prone opp arm/leg lifts, 5x5s holds, 3 sets ea, NT    19. Deadlift, 6#, 3x8      Time-based treatments/modalities:    Physical Therapy Timed Treatment Charges  Therapeutic exercise minutes (CPT 29293): 40 minutes      ASSESSMENT:   Progressed lat pulldown load and squat, also introduced hip hinges that are generally not tolerable with day to day activities and he tolerated edging to pain threshold at 6/10 and alternating with different seated activity, pt will benefit from continued skilled PT to progress strengthening and ROM as tolerated, improved self efficacy, and progressed toward meaningful functional activities    Goals:   Short Term Goals:   Lumbar flexion and R SB AROM improved by 10%  - met  Compliant with HEP 3x/wk or more to improved self efficacy - met  Pt will be able to squat holding 15# - MET    NEW  Pt will be able to squat holding 30# to progress lifting tolerance        Long Term Goals:    MARIZOL improved by 18% to indicate functional improvement (64% at eval) - DNT continue  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  Planned interventions for next visit: continue with current treatment.

## 2025-05-27 ENCOUNTER — TELEPHONE (OUTPATIENT)
Dept: CARDIOLOGY | Facility: MEDICAL CENTER | Age: 25
End: 2025-05-27
Payer: COMMERCIAL

## 2025-05-27 NOTE — TELEPHONE ENCOUNTER
Last OV: 11/30/2023  Proposed Surgery: Colonoscopy and EGD  Surgery Date: TBD  Requesting Office Name: [facility]  Fax Number: [phone]  Preference of Location (default is surgery center unless specified by Cardiologist or [facility])  Prior Clearance Addressed: No    Is this a general clearance? YES   Anticoags/Antiplatelets: Other NA  Anticoags/Antiplatelet managed by Cardiology? YES    Outstanding Cardiac Imaging : No  Ablation, Cardioversion, Stent, Cardiac Devices, Catheterization, Watchman: No  TAVR/Valve, Mitral Clip, Watchman (including open heart),: N/A   Recent Cardiac Hospitalization: No            When: N/A  History (cardiac history): Past Medical History[1]        Is this a dental clearance? NO  Ablation, Cardioversion, Watchman, Stents, Cath, Devices within the last 3 months? No   If yes- Send dental wait letter, do not forward to provider for review.     TAVR / Valve, Mitral clip within the last 6 months? No  If yes- Send dental wait letter, do not forward to provider for review.     If completing a general clearance, continue per protocol.           Surgical Clearance Letter Sent: YES   **Scan clearance request letter into [facility].**          [1]   Past Medical History:  Diagnosis Date    Asthma     Mass of left wrist 05/01/2024    Surgical intervention    NSVT (nonsustained ventricular tachycardia) (HCC)

## 2025-05-27 NOTE — LETTER
PROCEDURE/SURGERY CLEARANCE FORM      Encounter Date: 5/27/2025    Patient: Jessica Schafer  YOB: 2000    CARDIOLOGIST:  Amelia Retana    REFERRING DOCTOR:  No ref. provider found    The  following procedure/surgery: Colonoscopy and EGD                                            PROCEDURE/SURGERY CLEARANCE FORM    Date: 5/27/2025   Patient Name: Jessica Schafer    Dear Surgeon or Proceduralist,      Thank you for your request for cardiac stratification of our mutual patient Jessica Schafer 2000. We have reviewed their [facility] records; and to the best of our understanding this patient has not had stenting, ablation, watchman, cardiothoracic surgery or hospitalization for cardiovascular reasons in the past 6 months.  Jessica Schafer has been seen within the past 15 months and is considered to have non-modifiable cardiac risk for this low-risk procedure/surgery. They may proceed from a cardiovascular standpoint and may hold their antiplatelet/anticoagulation as briefly as possible. Please have patient resume this medication when hemodynamically stable to do so.     Aspirin or Prasugrel   - hold 7 days prior to procedure/surgery, resume when hemodynamically stable      Clopidrogrel or Ticagrelor  - hold 7 days for all neurological procedures, hold 5 days prior to all other procedure/surgery,  resume when hemodynamically stable     Warfarin - hold 7 days for all neurological procedures, hold 5 days prior to all other procedure/surgery and coordinate with [facility] ([phone]) INR testing and dose management.      Pradaxa/Xarelto/Eliquis/Savesya - hold 1 day prior to procedure for low bleeding risk procedure, 2 days for high bleeding risk procedure, or consider holding 3 days or longer for patients with reduced kidney function (CrCl <30mL/min) or spinal/cranial surgeries/procedures.      If they have a mechanical heart valve, please  coordinate with [facility] ([phone]) the proper management of their anticoagulant in the periprocedural or perioperative period.      Some patients have higher risk for cardiovascular complications or holding medication. If our patient has had prior complications of holding antiplatelet or anticoagulants in the past and we have seen them after these events, we have addressed these concerns with the patient. They are at an unknown degree of increased risk for recurrent complication.  You may hold anticoagulation/antiplatelets for the procedure or surgery if the benefits of the procedure or surgery outweigh this nonmodifiable risk.      If Jessica Schafer 2000 has new symptoms of heart failure decompensation, unstable arrythmia, or angina please reach out and we will assess the patient.      If you have other patient-specific concerns, please feel free to reach out to the patient's cardiologist directly at [phone].     Thank you,       [facility]          Electronically signed      MD Signature   Skip MCCLURE M.D.

## 2025-06-24 ENCOUNTER — TELEPHONE (OUTPATIENT)
Dept: PHYSICAL THERAPY | Facility: REHABILITATION | Age: 25
End: 2025-06-24
Payer: COMMERCIAL

## 2025-06-24 NOTE — OP THERAPY DISCHARGE SUMMARY
Outpatient Physical Therapy  DISCHARGE SUMMARY NOTE      [facility] [address].  [address]  [place] [address]  Phone:  [phone]  Fax:  [phone]    Date of Visit: 06/24/2025    Patient: Rick Schafer  YOB: 2000  MRN: [id number]     Referring Provider: No referring provider defined for this encounter.   Referring Diagnosis No admission diagnoses are documented for this encounter.         Functional Assessment Used        Your patient is being discharged from Physical Therapy with the following comments:   Patient has failed to schedule or reschedule follow-up visits    Comments:  Pt is self discharged at this time as they have requested to cancel all future appointments at this time. Pt was advised in previous sessions to contact PT as needed w/ any questions or concerns. Pt is anticipated to progress with adherence to HEP. Pt has a new PT referral and plans to return in July.      Mary Beth Shabazz, PT    Date: 6/24/2025

## 2025-07-11 ENCOUNTER — HOSPITAL ENCOUNTER (OUTPATIENT)
Facility: MEDICAL CENTER | Age: 25
End: 2025-07-11
Attending: FAMILY MEDICINE
Payer: COMMERCIAL

## 2025-07-11 LAB
BASOPHILS # BLD AUTO: 0.5 % (ref 0–1.8)
BASOPHILS # BLD: 0.05 K/UL (ref 0–0.12)
EOSINOPHIL # BLD AUTO: 0.1 K/UL (ref 0–0.51)
EOSINOPHIL NFR BLD: 1 % (ref 0–6.9)
ERYTHROCYTE [DISTWIDTH] IN BLOOD BY AUTOMATED COUNT: 42.3 FL (ref 35.9–50)
ESTRADIOL SERPL-MCNC: 73.8 PG/ML
HCT VFR BLD AUTO: 48.8 % (ref 37–47)
HGB BLD-MCNC: 16.4 G/DL (ref 12–16)
IMM GRANULOCYTES # BLD AUTO: 0.07 K/UL (ref 0–0.11)
IMM GRANULOCYTES NFR BLD AUTO: 0.7 % (ref 0–0.9)
LYMPHOCYTES # BLD AUTO: 2.24 K/UL (ref 1–4.8)
LYMPHOCYTES NFR BLD: 22.3 % (ref 22–41)
MCH RBC QN AUTO: 30 PG (ref 27–33)
MCHC RBC AUTO-ENTMCNC: 33.6 G/DL (ref 32.2–35.5)
MCV RBC AUTO: 89.4 FL (ref 81.4–97.8)
MONOCYTES # BLD AUTO: 0.76 K/UL (ref 0–0.85)
MONOCYTES NFR BLD AUTO: 7.6 % (ref 0–13.4)
NEUTROPHILS # BLD AUTO: 6.82 K/UL (ref 1.82–7.42)
NEUTROPHILS NFR BLD: 67.9 % (ref 44–72)
NRBC # BLD AUTO: 0 K/UL
NRBC BLD-RTO: 0 /100 WBC (ref 0–0.2)
PLATELET # BLD AUTO: 371 K/UL (ref 164–446)
PMV BLD AUTO: 9.7 FL (ref 9–12.9)
RBC # BLD AUTO: 5.46 M/UL (ref 4.2–5.4)
TESTOST SERPL-MCNC: 647 NG/DL (ref 9–75)
WBC # BLD AUTO: 10 K/UL (ref 4.8–10.8)

## 2025-07-11 PROCEDURE — 85025 COMPLETE CBC W/AUTO DIFF WBC: CPT

## 2025-07-11 PROCEDURE — 36415 COLL VENOUS BLD VENIPUNCTURE: CPT

## 2025-07-11 PROCEDURE — 84403 ASSAY OF TOTAL TESTOSTERONE: CPT

## 2025-07-11 PROCEDURE — 82670 ASSAY OF TOTAL ESTRADIOL: CPT

## 2025-08-01 ENCOUNTER — HOSPITAL ENCOUNTER (OUTPATIENT)
Dept: RADIOLOGY | Facility: MEDICAL CENTER | Age: 25
End: 2025-08-01
Payer: COMMERCIAL

## 2025-08-01 ENCOUNTER — HOSPITAL ENCOUNTER (OUTPATIENT)
Dept: RADIOLOGY | Facility: MEDICAL CENTER | Age: 25
End: 2025-08-01
Attending: FAMILY MEDICINE
Payer: COMMERCIAL

## 2025-08-01 DIAGNOSIS — R10.2 PELVIC PAIN SYNDROME: ICD-10-CM

## 2025-08-01 DIAGNOSIS — R10.9 ACUTE ABDOMINAL PAIN: ICD-10-CM

## 2025-08-01 PROCEDURE — 76700 US EXAM ABDOM COMPLETE: CPT

## 2025-08-01 PROCEDURE — 76830 TRANSVAGINAL US NON-OB: CPT

## 2025-08-04 ENCOUNTER — APPOINTMENT (OUTPATIENT)
Dept: PHYSICAL MEDICINE AND REHAB | Facility: MEDICAL CENTER | Age: 25
End: 2025-08-04
Payer: COMMERCIAL

## 2025-08-04 ASSESSMENT — FIBROSIS 4 INDEX: FIB4 SCORE: 0.28

## 2025-08-04 ASSESSMENT — PATIENT HEALTH QUESTIONNAIRE - PHQ9
5. POOR APPETITE OR OVEREATING: 1 - SEVERAL DAYS
CLINICAL INTERPRETATION OF PHQ2 SCORE: 2
SUM OF ALL RESPONSES TO PHQ QUESTIONS 1-9: 9

## 2025-08-04 ASSESSMENT — PAIN SCALES - GENERAL: PAINLEVEL_OUTOF10: 9=SEVERE PAIN

## 2025-08-06 ENCOUNTER — OFFICE VISIT (OUTPATIENT)
Dept: PHYSICAL MEDICINE AND REHAB | Facility: MEDICAL CENTER | Age: 25
End: 2025-08-06
Payer: COMMERCIAL

## 2025-08-06 VITALS
OXYGEN SATURATION: 95 % | DIASTOLIC BLOOD PRESSURE: 82 MMHG | WEIGHT: 293 LBS | HEART RATE: 99 BPM | SYSTOLIC BLOOD PRESSURE: 132 MMHG | BODY MASS INDEX: 41.95 KG/M2 | HEIGHT: 70 IN | TEMPERATURE: 97.6 F

## 2025-08-06 DIAGNOSIS — G89.29 CHRONIC BILATERAL LOW BACK PAIN WITHOUT SCIATICA: ICD-10-CM

## 2025-08-06 DIAGNOSIS — M79.10 MYALGIA: ICD-10-CM

## 2025-08-06 DIAGNOSIS — M79.18 MYOFASCIAL PAIN SYNDROME OF LUMBAR SPINE: ICD-10-CM

## 2025-08-06 DIAGNOSIS — M54.50 CHRONIC BILATERAL LOW BACK PAIN WITHOUT SCIATICA: ICD-10-CM

## 2025-08-06 PROCEDURE — 76942 ECHO GUIDE FOR BIOPSY: CPT | Performed by: STUDENT IN AN ORGANIZED HEALTH CARE EDUCATION/TRAINING PROGRAM

## 2025-08-06 PROCEDURE — 1125F AMNT PAIN NOTED PAIN PRSNT: CPT | Performed by: STUDENT IN AN ORGANIZED HEALTH CARE EDUCATION/TRAINING PROGRAM

## 2025-08-06 PROCEDURE — 20552 NJX 1/MLT TRIGGER POINT 1/2: CPT | Performed by: STUDENT IN AN ORGANIZED HEALTH CARE EDUCATION/TRAINING PROGRAM

## 2025-08-06 PROCEDURE — 3079F DIAST BP 80-89 MM HG: CPT | Performed by: STUDENT IN AN ORGANIZED HEALTH CARE EDUCATION/TRAINING PROGRAM

## 2025-08-06 PROCEDURE — 3075F SYST BP GE 130 - 139MM HG: CPT | Performed by: STUDENT IN AN ORGANIZED HEALTH CARE EDUCATION/TRAINING PROGRAM

## 2025-08-06 RX ORDER — DEXAMETHASONE SODIUM PHOSPHATE 4 MG/ML
4 INJECTION, SOLUTION INTRA-ARTICULAR; INTRALESIONAL; INTRAMUSCULAR; INTRAVENOUS; SOFT TISSUE ONCE
Status: COMPLETED | OUTPATIENT
Start: 2025-08-06 | End: 2025-08-06

## 2025-08-06 RX ADMIN — Medication 8 ML: at 16:55

## 2025-08-06 RX ADMIN — DEXAMETHASONE SODIUM PHOSPHATE 4 MG: 4 INJECTION, SOLUTION INTRA-ARTICULAR; INTRALESIONAL; INTRAMUSCULAR; INTRAVENOUS; SOFT TISSUE at 16:55

## 2025-08-06 ASSESSMENT — PATIENT HEALTH QUESTIONNAIRE - PHQ9: CLINICAL INTERPRETATION OF PHQ2 SCORE: 0

## 2025-08-06 ASSESSMENT — PAIN SCALES - GENERAL: PAINLEVEL_OUTOF10: 9=SEVERE PAIN

## 2025-08-06 ASSESSMENT — FIBROSIS 4 INDEX: FIB4 SCORE: 0.28

## 2025-08-26 ENCOUNTER — OFFICE VISIT (OUTPATIENT)
Dept: GYNECOLOGY | Facility: CLINIC | Age: 25
End: 2025-08-26
Payer: COMMERCIAL

## 2025-08-26 VITALS
HEART RATE: 103 BPM | BODY MASS INDEX: 41.95 KG/M2 | HEIGHT: 70 IN | WEIGHT: 293 LBS | SYSTOLIC BLOOD PRESSURE: 117 MMHG | DIASTOLIC BLOOD PRESSURE: 86 MMHG

## 2025-08-26 DIAGNOSIS — E66.813 CLASS 3 SEVERE OBESITY DUE TO EXCESS CALORIES WITHOUT SERIOUS COMORBIDITY WITH BODY MASS INDEX (BMI) OF 50.0 TO 59.9 IN ADULT: ICD-10-CM

## 2025-08-26 DIAGNOSIS — G89.29 CHRONIC PELVIC PAIN IN FEMALE: Primary | ICD-10-CM

## 2025-08-26 DIAGNOSIS — M79.18 MYOFASCIAL PAIN: ICD-10-CM

## 2025-08-26 DIAGNOSIS — R10.2 CHRONIC PELVIC PAIN IN FEMALE: Primary | ICD-10-CM

## 2025-08-26 ASSESSMENT — LIFESTYLE VARIABLES
HOW MANY STANDARD DRINKS CONTAINING ALCOHOL DO YOU HAVE ON A TYPICAL DAY: 1 OR 2
SKIP TO QUESTIONS 9-10: 0
AUDIT-C TOTAL SCORE: 2
HOW OFTEN DO YOU HAVE A DRINK CONTAINING ALCOHOL: MONTHLY OR LESS
HOW OFTEN DO YOU HAVE SIX OR MORE DRINKS ON ONE OCCASION: LESS THAN MONTHLY

## 2025-08-26 ASSESSMENT — FIBROSIS 4 INDEX: FIB4 SCORE: 0.28
